# Patient Record
Sex: FEMALE | Race: WHITE | Employment: UNEMPLOYED
[De-identification: names, ages, dates, MRNs, and addresses within clinical notes are randomized per-mention and may not be internally consistent; named-entity substitution may affect disease eponyms.]

---

## 2017-07-29 ENCOUNTER — HEALTH MAINTENANCE LETTER (OUTPATIENT)
Age: 32
End: 2017-07-29

## 2019-01-13 ENCOUNTER — HOSPITAL ENCOUNTER (EMERGENCY)
Facility: CLINIC | Age: 34
Discharge: HOME | End: 2019-01-13
Attending: EMERGENCY MEDICINE | Admitting: EMERGENCY MEDICINE
Payer: COMMERCIAL

## 2019-01-13 VITALS
HEART RATE: 107 BPM | HEIGHT: 72 IN | DIASTOLIC BLOOD PRESSURE: 101 MMHG | RESPIRATION RATE: 16 BRPM | OXYGEN SATURATION: 97 % | TEMPERATURE: 97.8 F | SYSTOLIC BLOOD PRESSURE: 130 MMHG | BODY MASS INDEX: 17.61 KG/M2 | WEIGHT: 130 LBS

## 2019-01-13 DIAGNOSIS — S01.81XA CHIN LACERATION, INITIAL ENCOUNTER: ICD-10-CM

## 2019-01-13 PROCEDURE — 99283 EMERGENCY DEPT VISIT LOW MDM: CPT | Performed by: EMERGENCY MEDICINE

## 2019-01-13 PROCEDURE — 99282 EMERGENCY DEPT VISIT SF MDM: CPT | Mod: 25 | Performed by: EMERGENCY MEDICINE

## 2019-01-13 PROCEDURE — 12011 RPR F/E/E/N/L/M 2.5 CM/<: CPT | Performed by: EMERGENCY MEDICINE

## 2019-01-13 PROCEDURE — 12011 RPR F/E/E/N/L/M 2.5 CM/<: CPT | Mod: Z6 | Performed by: EMERGENCY MEDICINE

## 2019-01-13 RX ORDER — LEVONORGESTREL/ETHIN.ESTRADIOL 0.1-0.02MG
1 TABLET ORAL AT BEDTIME
COMMUNITY

## 2019-01-13 ASSESSMENT — MIFFLIN-ST. JEOR: SCORE: 1427.56

## 2019-01-13 NOTE — ED TRIAGE NOTES
Pt states when leaving work tonight she slipped on the ice and fell forward. Pt hit chin on ice with laceration present under chin. Pt denies neck pain, back pain or LOC. Pt states she has some discomfort of knees, but is only concerned about chin lac. Bleeding is currently controlled.

## 2019-01-13 NOTE — ED PROVIDER NOTES
History     Chief Complaint   Patient presents with     Fall     0145 slipped on ice, hit chin on ice. no LOC. no back or neck pain. laceration present under chin     Facial Laceration     under chin, bleeding controlled.      HPI  Devika Weiss is a 33 year old female who presents for laceration.  Localized to chin.  Occurred when she slipped on ice just prior to arrival when leaving work.  She does not have changes to distal motor or sensation.  She has taken nothing for his pain.  Tetanus immunization status is up to date.  No other injuries.  She denies loss of consciousness.  No nausea or vomiting.  Denies headache, neck pain, chest pain, wrist pain.  Mild pain to the chin, aching.    Problem List:    Patient Active Problem List    Diagnosis Date Noted     Antepartum hemorrhage 09/21/2015     Priority: Medium     CARDIOVASCULAR SCREENING; LDL GOAL LESS THAN 160 10/31/2010     Priority: Medium     Encounter for routine gynecological examination 06/08/2010     Priority: Medium     Problem list name updated by automated process. Provider to review       Smoker 04/09/2009     Priority: Medium     She was encouraged to quit smoking.       Recurrent pregnancy loss, currently pregnant 05/06/2008     Priority: Medium     Problem list name updated by automated process. Provider to review       BICORNUATE UTERUS 05/06/2008     Priority: Medium     Problem list name updated by automated process. Provider to review and confirm          Past Medical History:    Past Medical History:   Diagnosis Date     ADHD      Allergic state        Past Surgical History:    Past Surgical History:   Procedure Laterality Date     D & C       SURGICAL HISTORY OF -       oral surgery       Family History:    Family History   Problem Relation Age of Onset     Cancer Brother         osteo carcoma     Genetic Disorder Brother         cleft lip/palate     Diabetes Father      Hypertension Father      Heart Disease Paternal Grandfather       "Hypertension Paternal Grandfather      Heart Disease Maternal Grandfather      Hypertension Maternal Grandfather      Heart Disease Paternal Grandmother      Hypertension Paternal Grandmother      Hypertension Mother        Social History:  Marital Status:   [2]  Social History     Tobacco Use     Smoking status: Former Smoker     Types: Cigarettes     Smokeless tobacco: Never Used     Tobacco comment: 5 per day   Substance Use Topics     Alcohol use: Yes     Comment: occassionally     Drug use: No        Medications:      Amphetamine-Dextroamphetamine (ADDERALL PO)   levonorgestrel-ethinyl estradiol (AVIANE/ALESSE/LESSINA) 0.1-20 MG-MCG tablet   Prenatal Vit-Fe Fumarate-FA (PRENATAL MULTIVITAMIN  PLUS IRON) 27-0.8 MG TABS   HYDROcodone-acetaminophen 5-325 MG per tablet         Review of Systems  A 4 point review of systems was performed. All pertinent positives and negatives were listed in the HPI and rest of ROS were otherwise negative.    Physical Exam   BP: (!) 130/101  Pulse: 107  Temp: 97.8  F (36.6  C)  Resp: 16  Height: 185.4 cm (6' 1\")  Weight: 59 kg (130 lb)  SpO2: 97 %      Physical Exam   Constitutional: She is oriented to person, place, and time. She appears well-developed and well-nourished. No distress.   HENT:   Head: Normocephalic.   Right Ear: Tympanic membrane normal.   Left Ear: Tympanic membrane normal.   Nose: Nose normal.   Mouth/Throat: No trismus in the jaw. No lacerations.   Eyes: No scleral icterus.   Neck: Normal range of motion. Neck supple.   Musculoskeletal:   Pain and swelling over the bilateral hands from boxing yesterday per patient   Neurological: She is alert and oriented to person, place, and time.   Skin: Skin is warm and dry. No rash noted. She is not diaphoretic. No erythema. No pallor.   Laceration to the chin, bleeding controlled       ED Course        Laceration repair  Date/Time: 1/13/2019 3:02 AM  Performed by: Jayden Cota MD  Authorized by: Beata, " Jayden Lacey MD   Consent: Verbal consent obtained.  Consent given by: patient  Body area: head/neck  Location details: chin  Laceration length: 2 cm  Foreign bodies: no foreign bodies  Tendon involvement: none  Nerve involvement: none  Vascular damage: no  Anesthesia: local infiltration    Anesthesia:  Local Anesthetic: bupivacaine 0.5% without epinephrine    Sedation:  Patient sedated: no    Preparation: Patient was prepped and draped in the usual sterile fashion.  Debridement: none  Degree of undermining: none  Skin closure: 6-0 nylon  Subcutaneous closure: 5-0 Vicryl  Number of sutures: 4  Technique: simple  Approximation: close  Approximation difficulty: simple  Dressing: antibiotic ointment  Patient tolerance: Patient tolerated the procedure well with no immediate complications                     Critical Care time:  none               No results found for this or any previous visit (from the past 24 hour(s)).    Medications - No data to display    Assessments & Plan (with Medical Decision Making)   Patient remained stable.  Based on mechanism of injury, I am not concerned for retained foreign body. The laceration was anesthetized, irrigated and closed, see associated procedure note.  Will not prescribe abx.  She will be discharged home.  Will return to the ED 4-5 days for suture removal, sooner for signs of infection.    I have reviewed the nursing notes.    I have reviewed the findings, diagnosis, plan and need for follow up with the patient.          Medication List      There are no discharge medications for this visit.         Final diagnoses:   Chin laceration, initial encounter       1/13/2019   [facility] EMERGENCY DEPARTMENT     Jayden Cota MD  01/13/19 0448

## 2019-01-13 NOTE — ED AVS SNAPSHOT
[facility] Emergency Department  [address] [address]  Phone:  [phone]  Fax:  [phone]                                    Devika Weiss   MRN: [id number]    Department:  [facility] Emergency Department   Date of Visit:  1/13/2019           After Visit Summary Signature Page    I have received my discharge instructions, and my questions have been answered. I have discussed any challenges I see with this plan with the nurse or doctor.    ..........................................................................................................................................  Patient/Patient Representative Signature      ..........................................................................................................................................  Patient Representative Print Name and Relationship to Patient    ..................................................               ................................................  Date                                   Time    ..........................................................................................................................................  Reviewed by Signature/Title    ...................................................              ..............................................  Date                                               Time          22EPIC Rev 08/18

## 2019-01-13 NOTE — DISCHARGE INSTRUCTIONS
Please keep the wound clean and dry for 24-48 hours. The affected area should be kept elevated as much as possible. After 24 hours, the dressing may be removed and the wound may be gently cleaned with warm water. You may apply antibiotic ointment as desired. You should return in 4-5 days for suture removal. Please return to the ER or to primary care physician immediately if you develop warmth, redness, swelling, drainage from the wound or have fevers.   Any time the skin is damaged, scar formation is unavoidable. You can minimize the scar by following the instructions listed above, and by preventing infection. The scar will continue to change for at least 1 year, and the final appearance of the scar will not be known until at least that time. One way to minimize scar formation is to apply sun screen to the scar before any sun exposure for 12 months following wound repair, as sunburn or even tanning may cause the scar to become discolored.

## 2021-11-08 ENCOUNTER — APPOINTMENT (OUTPATIENT)
Dept: RADIOLOGY | Facility: HOSPITAL | Age: 36
DRG: 177 | End: 2021-11-08
Attending: STUDENT IN AN ORGANIZED HEALTH CARE EDUCATION/TRAINING PROGRAM
Payer: COMMERCIAL

## 2021-11-08 ENCOUNTER — HOSPITAL ENCOUNTER (INPATIENT)
Facility: HOSPITAL | Age: 36
LOS: 6 days | Discharge: HOME | DRG: 177 | End: 2021-11-15
Attending: EMERGENCY MEDICINE | Admitting: HOSPITALIST
Payer: COMMERCIAL

## 2021-11-08 ENCOUNTER — APPOINTMENT (OUTPATIENT)
Dept: CT IMAGING | Facility: HOSPITAL | Age: 36
DRG: 177 | End: 2021-11-08
Attending: EMERGENCY MEDICINE
Payer: COMMERCIAL

## 2021-11-08 DIAGNOSIS — E87.6 HYPOKALEMIA: ICD-10-CM

## 2021-11-08 DIAGNOSIS — U07.1 PNEUMONIA DUE TO 2019 NOVEL CORONAVIRUS: ICD-10-CM

## 2021-11-08 DIAGNOSIS — R09.02 HYPOXEMIA: ICD-10-CM

## 2021-11-08 DIAGNOSIS — E83.42 HYPOMAGNESEMIA: ICD-10-CM

## 2021-11-08 DIAGNOSIS — J12.82 PNEUMONIA DUE TO 2019 NOVEL CORONAVIRUS: ICD-10-CM

## 2021-11-08 LAB
ANION GAP SERPL CALCULATED.3IONS-SCNC: 10 MMOL/L (ref 5–18)
BASOPHILS # BLD MANUAL: 0 10E3/UL (ref 0–0.2)
BASOPHILS NFR BLD MANUAL: 0 %
BUN SERPL-MCNC: 4 MG/DL (ref 8–22)
CALCIUM SERPL-MCNC: 9 MG/DL (ref 8.5–10.5)
CHLORIDE BLD-SCNC: 102 MMOL/L (ref 98–107)
CO2 SERPL-SCNC: 27 MMOL/L (ref 22–31)
CREAT SERPL-MCNC: 0.54 MG/DL (ref 0.6–1.1)
D DIMER PPP FEU-MCNC: 0.78 UG/ML FEU (ref 0–0.5)
EOSINOPHIL # BLD MANUAL: 0 10E3/UL (ref 0–0.7)
EOSINOPHIL NFR BLD MANUAL: 0 %
ERYTHROCYTE [DISTWIDTH] IN BLOOD BY AUTOMATED COUNT: 13 % (ref 10–15)
FLUAV RNA SPEC QL NAA+PROBE: NEGATIVE
FLUBV RNA RESP QL NAA+PROBE: NEGATIVE
GFR SERPL CREATININE-BSD FRML MDRD: >90 ML/MIN/1.73M2
GLUCOSE BLD-MCNC: 101 MG/DL (ref 70–125)
HCT VFR BLD AUTO: 39.9 % (ref 35–47)
HGB BLD-MCNC: 13.6 G/DL (ref 11.7–15.7)
LYMPHOCYTES # BLD MANUAL: 0.2 10E3/UL (ref 0.8–5.3)
LYMPHOCYTES NFR BLD MANUAL: 7 %
MAGNESIUM SERPL-MCNC: 1.2 MG/DL (ref 1.8–2.6)
MCH RBC QN AUTO: 37.3 PG (ref 26.5–33)
MCHC RBC AUTO-ENTMCNC: 34.1 G/DL (ref 31.5–36.5)
MCV RBC AUTO: 109 FL (ref 78–100)
MONOCYTES # BLD MANUAL: 0.2 10E3/UL (ref 0–1.3)
MONOCYTES NFR BLD MANUAL: 6 %
NEUTROPHILS # BLD MANUAL: 2.4 10E3/UL (ref 1.6–8.3)
NEUTROPHILS NFR BLD MANUAL: 87 %
PLAT MORPH BLD: ABNORMAL
PLATELET # BLD AUTO: 79 10E3/UL (ref 150–450)
POTASSIUM BLD-SCNC: 2.7 MMOL/L (ref 3.5–5)
RBC # BLD AUTO: 3.65 10E6/UL (ref 3.8–5.2)
RBC MORPH BLD: ABNORMAL
SARS-COV-2 RNA RESP QL NAA+PROBE: POSITIVE
SODIUM SERPL-SCNC: 139 MMOL/L (ref 136–145)
TROPONIN I SERPL-MCNC: 0.01 NG/ML (ref 0–0.29)
WBC # BLD AUTO: 2.8 10E3/UL (ref 4–11)

## 2021-11-08 PROCEDURE — XW033E5 INTRODUCTION OF REMDESIVIR ANTI-INFECTIVE INTO PERIPHERAL VEIN, PERCUTANEOUS APPROACH, NEW TECHNOLOGY GROUP 5: ICD-10-PCS | Performed by: EMERGENCY MEDICINE

## 2021-11-08 PROCEDURE — 85379 FIBRIN DEGRADATION QUANT: CPT | Performed by: EMERGENCY MEDICINE

## 2021-11-08 PROCEDURE — 85384 FIBRINOGEN ACTIVITY: CPT | Performed by: HOSPITALIST

## 2021-11-08 PROCEDURE — 84145 PROCALCITONIN (PCT): CPT | Performed by: EMERGENCY MEDICINE

## 2021-11-08 PROCEDURE — 93005 ELECTROCARDIOGRAM TRACING: CPT | Performed by: EMERGENCY MEDICINE

## 2021-11-08 PROCEDURE — 86141 C-REACTIVE PROTEIN HS: CPT | Performed by: HOSPITALIST

## 2021-11-08 PROCEDURE — 250N000011 HC RX IP 250 OP 636: Performed by: EMERGENCY MEDICINE

## 2021-11-08 PROCEDURE — 36415 COLL VENOUS BLD VENIPUNCTURE: CPT | Performed by: HOSPITALIST

## 2021-11-08 PROCEDURE — 250N000011 HC RX IP 250 OP 636: Performed by: FAMILY MEDICINE

## 2021-11-08 PROCEDURE — 87636 SARSCOV2 & INF A&B AMP PRB: CPT | Performed by: FAMILY MEDICINE

## 2021-11-08 PROCEDURE — 80048 BASIC METABOLIC PNL TOTAL CA: CPT | Performed by: FAMILY MEDICINE

## 2021-11-08 PROCEDURE — 93005 ELECTROCARDIOGRAM TRACING: CPT | Performed by: STUDENT IN AN ORGANIZED HEALTH CARE EDUCATION/TRAINING PROGRAM

## 2021-11-08 PROCEDURE — 99285 EMERGENCY DEPT VISIT HI MDM: CPT | Mod: 25

## 2021-11-08 PROCEDURE — 82728 ASSAY OF FERRITIN: CPT | Performed by: HOSPITALIST

## 2021-11-08 PROCEDURE — 71046 X-RAY EXAM CHEST 2 VIEWS: CPT

## 2021-11-08 PROCEDURE — 85610 PROTHROMBIN TIME: CPT | Performed by: HOSPITALIST

## 2021-11-08 PROCEDURE — C9803 HOPD COVID-19 SPEC COLLECT: HCPCS

## 2021-11-08 PROCEDURE — 36415 COLL VENOUS BLD VENIPUNCTURE: CPT | Performed by: EMERGENCY MEDICINE

## 2021-11-08 PROCEDURE — 83735 ASSAY OF MAGNESIUM: CPT | Performed by: FAMILY MEDICINE

## 2021-11-08 PROCEDURE — 84484 ASSAY OF TROPONIN QUANT: CPT | Performed by: EMERGENCY MEDICINE

## 2021-11-08 PROCEDURE — 71275 CT ANGIOGRAPHY CHEST: CPT

## 2021-11-08 PROCEDURE — 82248 BILIRUBIN DIRECT: CPT | Performed by: EMERGENCY MEDICINE

## 2021-11-08 PROCEDURE — 85027 COMPLETE CBC AUTOMATED: CPT | Performed by: FAMILY MEDICINE

## 2021-11-08 PROCEDURE — 36415 COLL VENOUS BLD VENIPUNCTURE: CPT | Performed by: FAMILY MEDICINE

## 2021-11-08 RX ORDER — MAGNESIUM SULFATE HEPTAHYDRATE 40 MG/ML
2 INJECTION, SOLUTION INTRAVENOUS ONCE
Status: COMPLETED | OUTPATIENT
Start: 2021-11-09 | End: 2021-11-09

## 2021-11-08 RX ORDER — ONDANSETRON 4 MG/1
4 TABLET, ORALLY DISINTEGRATING ORAL ONCE
Status: COMPLETED | OUTPATIENT
Start: 2021-11-08 | End: 2021-11-08

## 2021-11-08 RX ORDER — IOPAMIDOL 755 MG/ML
100 INJECTION, SOLUTION INTRAVASCULAR ONCE
Status: COMPLETED | OUTPATIENT
Start: 2021-11-09 | End: 2021-11-08

## 2021-11-08 RX ORDER — POTASSIUM CHLORIDE 7.45 MG/ML
10 INJECTION INTRAVENOUS ONCE
Status: COMPLETED | OUTPATIENT
Start: 2021-11-09 | End: 2021-11-09

## 2021-11-08 RX ORDER — POTASSIUM CHLORIDE 20MEQ/15ML
40 LIQUID (ML) ORAL ONCE
Status: COMPLETED | OUTPATIENT
Start: 2021-11-08 | End: 2021-11-09

## 2021-11-08 RX ORDER — BENZONATATE 100 MG/1
100 CAPSULE ORAL 3 TIMES DAILY PRN
Status: DISCONTINUED | OUTPATIENT
Start: 2021-11-08 | End: 2021-11-09

## 2021-11-08 RX ORDER — DEXAMETHASONE SODIUM PHOSPHATE 10 MG/ML
6 INJECTION, SOLUTION INTRAMUSCULAR; INTRAVENOUS ONCE
Status: COMPLETED | OUTPATIENT
Start: 2021-11-09 | End: 2021-11-09

## 2021-11-08 RX ORDER — ALBUTEROL SULFATE 90 UG/1
6 AEROSOL, METERED RESPIRATORY (INHALATION)
Status: DISCONTINUED | OUTPATIENT
Start: 2021-11-09 | End: 2021-11-12

## 2021-11-08 RX ADMIN — ONDANSETRON 4 MG: 4 TABLET, ORALLY DISINTEGRATING ORAL at 23:03

## 2021-11-08 RX ADMIN — IOPAMIDOL 100 ML: 755 INJECTION, SOLUTION INTRAVENOUS at 23:39

## 2021-11-08 ASSESSMENT — MIFFLIN-ST. JEOR: SCORE: 1448.84

## 2021-11-09 PROBLEM — R79.89 LFT ELEVATION: Status: ACTIVE | Noted: 2021-11-09

## 2021-11-09 PROBLEM — E83.42 HYPOMAGNESEMIA: Status: ACTIVE | Noted: 2021-11-09

## 2021-11-09 PROBLEM — U07.1 PNEUMONIA DUE TO 2019 NOVEL CORONAVIRUS: Status: ACTIVE | Noted: 2021-11-09

## 2021-11-09 PROBLEM — E87.6 HYPOKALEMIA: Status: ACTIVE | Noted: 2021-11-09

## 2021-11-09 PROBLEM — J12.82 PNEUMONIA DUE TO 2019 NOVEL CORONAVIRUS: Status: ACTIVE | Noted: 2021-11-09

## 2021-11-09 PROBLEM — J96.01 ACUTE RESPIRATORY FAILURE WITH HYPOXIA (H): Status: ACTIVE | Noted: 2021-11-09

## 2021-11-09 PROBLEM — R09.02 HYPOXEMIA: Status: ACTIVE | Noted: 2021-11-09

## 2021-11-09 LAB
ABO/RH(D): NORMAL
ALBUMIN SERPL-MCNC: 2.9 G/DL (ref 3.5–5)
ALBUMIN SERPL-MCNC: 3.1 G/DL (ref 3.5–5)
ALP SERPL-CCNC: 186 U/L (ref 45–120)
ALP SERPL-CCNC: 196 U/L (ref 45–120)
ALT SERPL W P-5'-P-CCNC: 76 U/L (ref 0–45)
ALT SERPL W P-5'-P-CCNC: 92 U/L (ref 0–45)
ANION GAP SERPL CALCULATED.3IONS-SCNC: 12 MMOL/L (ref 5–18)
APTT PPP: 42 SECONDS (ref 22–38)
AST SERPL W P-5'-P-CCNC: 227 U/L (ref 0–40)
AST SERPL W P-5'-P-CCNC: 302 U/L (ref 0–40)
ATRIAL RATE - MUSE: 99 BPM
BILIRUB DIRECT SERPL-MCNC: 1.1 MG/DL
BILIRUB SERPL-MCNC: 1.6 MG/DL (ref 0–1)
BILIRUB SERPL-MCNC: 1.8 MG/DL (ref 0–1)
BUN SERPL-MCNC: 4 MG/DL (ref 8–22)
C REACTIVE PROTEIN LHE: 6.4 MG/DL (ref 0–0.8)
C REACTIVE PROTEIN LHE: 7.1 MG/DL (ref 0–0.8)
CALCIUM SERPL-MCNC: 8.3 MG/DL (ref 8.5–10.5)
CHLORIDE BLD-SCNC: 107 MMOL/L (ref 98–107)
CO2 SERPL-SCNC: 22 MMOL/L (ref 22–31)
CREAT SERPL-MCNC: 0.5 MG/DL (ref 0.6–1.1)
D DIMER PPP FEU-MCNC: 0.52 UG/ML FEU (ref 0–0.5)
DIASTOLIC BLOOD PRESSURE - MUSE: NORMAL MMHG
ERYTHROCYTE [DISTWIDTH] IN BLOOD BY AUTOMATED COUNT: 13 % (ref 10–15)
FERRITIN SERPL-MCNC: 3372 NG/ML (ref 10–130)
FIBRINOGEN PPP-MCNC: 435 MG/DL (ref 170–490)
FIBRINOGEN PPP-MCNC: 441 MG/DL (ref 170–490)
GFR SERPL CREATININE-BSD FRML MDRD: >90 ML/MIN/1.73M2
GLUCOSE BLD-MCNC: 136 MG/DL (ref 70–125)
HCT VFR BLD AUTO: 38.4 % (ref 35–47)
HGB BLD-MCNC: 12.9 G/DL (ref 11.7–15.7)
INR PPP: 1.07 (ref 0.9–1.15)
INTERPRETATION ECG - MUSE: NORMAL
LDH SERPL L TO P-CCNC: 457 U/L (ref 125–220)
MAGNESIUM SERPL-MCNC: 1.8 MG/DL (ref 1.8–2.6)
MCH RBC QN AUTO: 37.2 PG (ref 26.5–33)
MCHC RBC AUTO-ENTMCNC: 33.6 G/DL (ref 31.5–36.5)
MCV RBC AUTO: 111 FL (ref 78–100)
P AXIS - MUSE: 60 DEGREES
PLATELET # BLD AUTO: 79 10E3/UL (ref 150–450)
POTASSIUM BLD-SCNC: 3.4 MMOL/L (ref 3.5–5)
PR INTERVAL - MUSE: 144 MS
PROCALCITONIN SERPL-MCNC: 0.33 NG/ML (ref 0–0.49)
PROT SERPL-MCNC: 6.3 G/DL (ref 6–8)
PROT SERPL-MCNC: 6.9 G/DL (ref 6–8)
QRS DURATION - MUSE: 80 MS
QT - MUSE: 352 MS
QTC - MUSE: 451 MS
R AXIS - MUSE: 78 DEGREES
RBC # BLD AUTO: 3.47 10E6/UL (ref 3.8–5.2)
SODIUM SERPL-SCNC: 141 MMOL/L (ref 136–145)
SPECIMEN EXPIRATION DATE: NORMAL
SYSTOLIC BLOOD PRESSURE - MUSE: NORMAL MMHG
T AXIS - MUSE: 47 DEGREES
VENTRICULAR RATE- MUSE: 99 BPM
WBC # BLD AUTO: 1.8 10E3/UL (ref 4–11)

## 2021-11-09 PROCEDURE — 80053 COMPREHEN METABOLIC PANEL: CPT | Performed by: EMERGENCY MEDICINE

## 2021-11-09 PROCEDURE — 99291 CRITICAL CARE FIRST HOUR: CPT | Performed by: INTERNAL MEDICINE

## 2021-11-09 PROCEDURE — 36415 COLL VENOUS BLD VENIPUNCTURE: CPT | Performed by: HOSPITALIST

## 2021-11-09 PROCEDURE — 250N000013 HC RX MED GY IP 250 OP 250 PS 637: Performed by: HOSPITALIST

## 2021-11-09 PROCEDURE — 250N000009 HC RX 250: Performed by: EMERGENCY MEDICINE

## 2021-11-09 PROCEDURE — 96367 TX/PROPH/DG ADDL SEQ IV INF: CPT

## 2021-11-09 PROCEDURE — 250N000011 HC RX IP 250 OP 636: Performed by: EMERGENCY MEDICINE

## 2021-11-09 PROCEDURE — 258N000003 HC RX IP 258 OP 636: Performed by: EMERGENCY MEDICINE

## 2021-11-09 PROCEDURE — 83520 IMMUNOASSAY QUANT NOS NONAB: CPT | Performed by: HOSPITALIST

## 2021-11-09 PROCEDURE — 96365 THER/PROPH/DIAG IV INF INIT: CPT | Mod: 59

## 2021-11-09 PROCEDURE — 99223 1ST HOSP IP/OBS HIGH 75: CPT | Mod: AI | Performed by: HOSPITALIST

## 2021-11-09 PROCEDURE — 200N000001 HC R&B ICU

## 2021-11-09 PROCEDURE — 85730 THROMBOPLASTIN TIME PARTIAL: CPT | Performed by: HOSPITALIST

## 2021-11-09 PROCEDURE — 250N000013 HC RX MED GY IP 250 OP 250 PS 637: Performed by: FAMILY MEDICINE

## 2021-11-09 PROCEDURE — 250N000011 HC RX IP 250 OP 636: Performed by: HOSPITALIST

## 2021-11-09 PROCEDURE — 999N000157 HC STATISTIC RCP TIME EA 10 MIN

## 2021-11-09 PROCEDURE — 250N000012 HC RX MED GY IP 250 OP 636 PS 637: Performed by: HOSPITALIST

## 2021-11-09 PROCEDURE — 96375 TX/PRO/DX INJ NEW DRUG ADDON: CPT

## 2021-11-09 PROCEDURE — 86900 BLOOD TYPING SEROLOGIC ABO: CPT | Performed by: HOSPITALIST

## 2021-11-09 PROCEDURE — 85384 FIBRINOGEN ACTIVITY: CPT | Performed by: HOSPITALIST

## 2021-11-09 PROCEDURE — 86141 C-REACTIVE PROTEIN HS: CPT | Performed by: HOSPITALIST

## 2021-11-09 PROCEDURE — 272N000054 HC CANNULA HIGH FLOW, ADULT

## 2021-11-09 PROCEDURE — 250N000013 HC RX MED GY IP 250 OP 250 PS 637: Performed by: EMERGENCY MEDICINE

## 2021-11-09 PROCEDURE — 85027 COMPLETE CBC AUTOMATED: CPT | Performed by: HOSPITALIST

## 2021-11-09 PROCEDURE — 99207 PR CDG-CORRECTLY CODED, REVIEWED AND AGREE: CPT | Performed by: HOSPITALIST

## 2021-11-09 PROCEDURE — 83615 LACTATE (LD) (LDH) ENZYME: CPT | Performed by: HOSPITALIST

## 2021-11-09 PROCEDURE — 999N000215 HC STATISTIC HFNC ADULT NON-CPAP

## 2021-11-09 PROCEDURE — 83735 ASSAY OF MAGNESIUM: CPT | Performed by: INTERNAL MEDICINE

## 2021-11-09 PROCEDURE — 94640 AIRWAY INHALATION TREATMENT: CPT

## 2021-11-09 PROCEDURE — 99232 SBSQ HOSP IP/OBS MODERATE 35: CPT | Performed by: INTERNAL MEDICINE

## 2021-11-09 PROCEDURE — 250N000013 HC RX MED GY IP 250 OP 250 PS 637: Performed by: INTERNAL MEDICINE

## 2021-11-09 PROCEDURE — 85379 FIBRIN DEGRADATION QUANT: CPT | Performed by: HOSPITALIST

## 2021-11-09 PROCEDURE — 85300 ANTITHROMBIN III ACTIVITY: CPT | Performed by: HOSPITALIST

## 2021-11-09 RX ORDER — ONDANSETRON 2 MG/ML
4 INJECTION INTRAMUSCULAR; INTRAVENOUS EVERY 6 HOURS PRN
Status: DISCONTINUED | OUTPATIENT
Start: 2021-11-09 | End: 2021-11-15 | Stop reason: HOSPADM

## 2021-11-09 RX ORDER — ONDANSETRON 4 MG/1
4 TABLET, ORALLY DISINTEGRATING ORAL EVERY 6 HOURS PRN
Status: DISCONTINUED | OUTPATIENT
Start: 2021-11-09 | End: 2021-11-15 | Stop reason: HOSPADM

## 2021-11-09 RX ORDER — LIDOCAINE 40 MG/G
CREAM TOPICAL
Status: DISCONTINUED | OUTPATIENT
Start: 2021-11-09 | End: 2021-11-15 | Stop reason: HOSPADM

## 2021-11-09 RX ORDER — FAMOTIDINE 20 MG/1
20 TABLET, FILM COATED ORAL 2 TIMES DAILY
Status: DISCONTINUED | OUTPATIENT
Start: 2021-11-09 | End: 2021-11-15 | Stop reason: HOSPADM

## 2021-11-09 RX ORDER — BISACODYL 10 MG
10 SUPPOSITORY, RECTAL RECTAL DAILY PRN
Status: DISCONTINUED | OUTPATIENT
Start: 2021-11-09 | End: 2021-11-15 | Stop reason: HOSPADM

## 2021-11-09 RX ORDER — ACETAMINOPHEN 325 MG/1
650 TABLET ORAL EVERY 6 HOURS PRN
Status: DISCONTINUED | OUTPATIENT
Start: 2021-11-09 | End: 2021-11-15 | Stop reason: HOSPADM

## 2021-11-09 RX ORDER — LANOLIN ALCOHOL/MO/W.PET/CERES
3 CREAM (GRAM) TOPICAL
Status: DISCONTINUED | OUTPATIENT
Start: 2021-11-09 | End: 2021-11-15 | Stop reason: HOSPADM

## 2021-11-09 RX ORDER — HYDROXYZINE HYDROCHLORIDE 25 MG/1
25 TABLET, FILM COATED ORAL EVERY 6 HOURS PRN
Status: DISCONTINUED | OUTPATIENT
Start: 2021-11-09 | End: 2021-11-15 | Stop reason: HOSPADM

## 2021-11-09 RX ORDER — AMOXICILLIN 250 MG
1 CAPSULE ORAL 2 TIMES DAILY PRN
Status: DISCONTINUED | OUTPATIENT
Start: 2021-11-09 | End: 2021-11-15 | Stop reason: HOSPADM

## 2021-11-09 RX ORDER — ACETAMINOPHEN 650 MG/1
650 SUPPOSITORY RECTAL EVERY 6 HOURS PRN
Status: DISCONTINUED | OUTPATIENT
Start: 2021-11-09 | End: 2021-11-15 | Stop reason: HOSPADM

## 2021-11-09 RX ORDER — BENZONATATE 100 MG/1
100 CAPSULE ORAL
Status: DISCONTINUED | OUTPATIENT
Start: 2021-11-09 | End: 2021-11-15 | Stop reason: HOSPADM

## 2021-11-09 RX ORDER — DEXAMETHASONE 2 MG/1
6 TABLET ORAL DAILY
Status: DISCONTINUED | OUTPATIENT
Start: 2021-11-09 | End: 2021-11-15

## 2021-11-09 RX ORDER — ALBUTEROL SULFATE 90 UG/1
2 AEROSOL, METERED RESPIRATORY (INHALATION) 4 TIMES DAILY
Status: DISCONTINUED | OUTPATIENT
Start: 2021-11-09 | End: 2021-11-09

## 2021-11-09 RX ORDER — ALBUTEROL SULFATE 90 UG/1
2 AEROSOL, METERED RESPIRATORY (INHALATION) EVERY 4 HOURS PRN
Status: DISCONTINUED | OUTPATIENT
Start: 2021-11-09 | End: 2021-11-15 | Stop reason: HOSPADM

## 2021-11-09 RX ORDER — NICOTINE 21 MG/24HR
1 PATCH, TRANSDERMAL 24 HOURS TRANSDERMAL DAILY
Status: DISCONTINUED | OUTPATIENT
Start: 2021-11-09 | End: 2021-11-15 | Stop reason: HOSPADM

## 2021-11-09 RX ORDER — PROCHLORPERAZINE MALEATE 10 MG
10 TABLET ORAL EVERY 6 HOURS PRN
Status: DISCONTINUED | OUTPATIENT
Start: 2021-11-09 | End: 2021-11-15 | Stop reason: HOSPADM

## 2021-11-09 RX ORDER — PROCHLORPERAZINE 25 MG
25 SUPPOSITORY, RECTAL RECTAL EVERY 12 HOURS PRN
Status: DISCONTINUED | OUTPATIENT
Start: 2021-11-09 | End: 2021-11-15 | Stop reason: HOSPADM

## 2021-11-09 RX ADMIN — ENOXAPARIN SODIUM 40 MG: 40 INJECTION SUBCUTANEOUS at 03:25

## 2021-11-09 RX ADMIN — BARICITINIB 4 MG: 2 TABLET, FILM COATED ORAL at 10:45

## 2021-11-09 RX ADMIN — MAGNESIUM SULFATE HEPTAHYDRATE 2 G: 40 INJECTION, SOLUTION INTRAVENOUS at 00:06

## 2021-11-09 RX ADMIN — ALBUTEROL SULFATE 6 PUFF: 90 AEROSOL, METERED RESPIRATORY (INHALATION) at 02:03

## 2021-11-09 RX ADMIN — REMDESIVIR 200 MG: 100 INJECTION, POWDER, LYOPHILIZED, FOR SOLUTION INTRAVENOUS at 02:10

## 2021-11-09 RX ADMIN — NICOTINE 1 PATCH: 14 PATCH, EXTENDED RELEASE TRANSDERMAL at 08:20

## 2021-11-09 RX ADMIN — HYDROXYZINE HYDROCHLORIDE 25 MG: 25 TABLET, FILM COATED ORAL at 03:22

## 2021-11-09 RX ADMIN — FAMOTIDINE 20 MG: 20 TABLET, FILM COATED ORAL at 20:33

## 2021-11-09 RX ADMIN — BENZONATATE 100 MG: 100 CAPSULE ORAL at 16:00

## 2021-11-09 RX ADMIN — DEXAMETHASONE SODIUM PHOSPHATE 6 MG: 10 INJECTION INTRAMUSCULAR; INTRAVENOUS at 00:11

## 2021-11-09 RX ADMIN — POTASSIUM CHLORIDE 10 MEQ: 7.46 INJECTION, SOLUTION INTRAVENOUS at 00:12

## 2021-11-09 RX ADMIN — DEXAMETHASONE 6 MG: 2 TABLET ORAL at 12:33

## 2021-11-09 RX ADMIN — ALBUTEROL SULFATE 6 PUFF: 90 AEROSOL, METERED RESPIRATORY (INHALATION) at 20:30

## 2021-11-09 RX ADMIN — POTASSIUM CHLORIDE 40 MEQ: 1.5 SOLUTION ORAL at 00:09

## 2021-11-09 RX ADMIN — ALBUTEROL SULFATE 6 PUFF: 90 AEROSOL, METERED RESPIRATORY (INHALATION) at 08:20

## 2021-11-09 RX ADMIN — FAMOTIDINE 20 MG: 20 TABLET, FILM COATED ORAL at 08:20

## 2021-11-09 RX ADMIN — ALBUTEROL SULFATE 6 PUFF: 90 AEROSOL, METERED RESPIRATORY (INHALATION) at 14:53

## 2021-11-09 RX ADMIN — SODIUM CHLORIDE 50 ML: 9 INJECTION, SOLUTION INTRAVENOUS at 02:11

## 2021-11-09 RX ADMIN — BENZONATATE 100 MG: 100 CAPSULE ORAL at 09:11

## 2021-11-09 ASSESSMENT — ENCOUNTER SYMPTOMS
CHEST TIGHTNESS: 1
COUGH: 1
APPETITE CHANGE: 1
MYALGIAS: 1
FEVER: 1
SHORTNESS OF BREATH: 1

## 2021-11-09 ASSESSMENT — ACTIVITIES OF DAILY LIVING (ADL)
ADLS_ACUITY_SCORE: 12
ADLS_ACUITY_SCORE: 9
ADLS_ACUITY_SCORE: 9
ADLS_ACUITY_SCORE: 14
ADLS_ACUITY_SCORE: 12
ADLS_ACUITY_SCORE: 9
DRESSING/BATHING_DIFFICULTY: NO
ADLS_ACUITY_SCORE: 12
HEARING_DIFFICULTY_OR_DEAF: NO
ADLS_ACUITY_SCORE: 12
ADLS_ACUITY_SCORE: 14
ADLS_ACUITY_SCORE: 5
ADLS_ACUITY_SCORE: 5
ADLS_ACUITY_SCORE: 14
TOILETING_ISSUES: NO
ADLS_ACUITY_SCORE: 9
DIFFICULTY_COMMUNICATING: NO
DOING_ERRANDS_INDEPENDENTLY_DIFFICULTY: NO
WEAR_GLASSES_OR_BLIND: NO
ADLS_ACUITY_SCORE: 12
ADLS_ACUITY_SCORE: 9
ADLS_ACUITY_SCORE: 12
FALL_HISTORY_WITHIN_LAST_SIX_MONTHS: NO
ADLS_ACUITY_SCORE: 9
DEPENDENT_IADLS:: INDEPENDENT
ADLS_ACUITY_SCORE: 9
PATIENT_/_FAMILY_COMMUNICATION_STYLE: SPOKEN LANGUAGE (ENGLISH OR BILINGUAL)
ADLS_ACUITY_SCORE: 9
ADLS_ACUITY_SCORE: 12
ADLS_ACUITY_SCORE: 12
DIFFICULTY_EATING/SWALLOWING: NO
ADLS_ACUITY_SCORE: 5
CONCENTRATING,_REMEMBERING_OR_MAKING_DECISIONS_DIFFICULTY: NO
WALKING_OR_CLIMBING_STAIRS_DIFFICULTY: NO

## 2021-11-09 ASSESSMENT — MIFFLIN-ST. JEOR: SCORE: 1359.94

## 2021-11-09 NOTE — ED PROVIDER NOTES
NAME: Devika Velázquez  AGE: 36 year old female  YOB: 1985  MRN: [id number]  EVALUATION DATE & TIME: 2021 11:20 PM    PCP: Violetta Mendiola    ED PROVIDER: Pawel Brown M.D.      Chief Complaint   Patient presents with     Shortness of Breath     FINAL IMPRESSION:  1. Pneumonia due to 2019 novel coronavirus    2. Hypoxemia    3. Hypokalemia    4. Hypomagnesemia      MEDICAL DECISION MAKIN:00 AM I met with the patient, obtained history, performed an initial exam, and discussed options and plan for diagnostics and treatment here in the ED.   1:08 AM I spoke with Dr. Verduzco with the [facility].  Pertinent Labs & Imaging studies reviewed. (See chart for details)     Patient was clinically assessed and consented to treatment. After assessment, medical decision making and workup were discussed with the patient. The patient was agreeable to plan for testing, workup, and treatment.    Devika Velázquez is a 36 year old female who presents with shortness of breath  Differential diagnosis includes but not limited to pneumonia, COVID-19, hypoxemia, pulmonary embolism, acute coronary syndrome, dehydration, acute kidney injury.  Patient otherwise healthy 36-year-old female presenting for shortness of breath and in triage was hypoxemic into the 80s.  Patient placed on oxygen there and oxygen in the low 90s and patient more comfortable.  Labs done from triage did show hypomagnesemia, hypokalemia, elevated D-dimer and no significant dehydration or kidney injury.  COVID-19 swab was positive and chest x-ray did show bilateral patchy infiltrates consistent with COVID-19.  Patient hypoxemic likely from COVID-19 however will also get CT scan to rule out pulmonary embolism associated with the COVID-19.  CTA was negative for PE and patient maintaining oxygen on nasal cannula.  She is comfortable however does have continued cough.  Tessalon Perles will be ordered for this along with albuterol to help with  some tightness that she feels with the coughing.  Additionally given the hypoxemia patient will need admission.  Trial of ambulation of oxygen resulted in oxygen saturations of 82-83% and confirms need for admission.  Patient discussed with Dr. Verduzco from the hospitalist service for admission.  Patient tolerating nasal cannula at 3-4 L presently.  After discussing admission patient had gotten up to go to bathroom again and desaturated to the low 80s.  Patient placed back in bed on nasal cannula and requiring 6 L to bring her saturations back up to 93%.  Discussion with patient will try high flow nasal cannula however patient felt anxious and uncomfortable with the high flow oxygen and could not tolerate this.  Patient switch back to mask and maintaining 93% with Ventimask.  We will continue this and patient's oxygenation was discussed with hospitalist who is aware and will continue monitoring with boarding in the ER.    The importance of close follow up was discussed. We reviewed warning signs and symptoms, and I instructed Ms. Velázquez to return to the emergency department immediately if she develops any new or worsening symptoms. I provided additional verbal discharge instructions. Ms. Velázquez expressed understanding and agreement with this plan of care, her questions were answered, and she was discharged in stable condition.       30 minutes of critical care time    MEDICATIONS GIVEN IN THE EMERGENCY:  Medications   benzonatate (TESSALON) capsule 100 mg (has no administration in time range)   albuterol (PROAIR HFA/PROVENTIL HFA/VENTOLIN HFA) 108 (90 Base) MCG/ACT inhaler 6 puff (6 puffs Inhalation Given 11/9/21 0203)   remdesivir 200 mg in sodium chloride 0.9 % 250 mL intermittent infusion (200 mg Intravenous New Bag 11/9/21 0210)     Followed by   0.9% sodium chloride BOLUS (50 mLs Intravenous New Bag 11/9/21 0211)   remdesivir 100 mg in sodium chloride 0.9 % 250 mL intermittent infusion (has no  administration in time range)     And   0.9% sodium chloride BOLUS (has no administration in time range)   potassium chloride (KAYCIEL) solution 40 mEq (40 mEq Oral Given 11/9/21 0009)   ondansetron (ZOFRAN-ODT) ODT tab 4 mg (4 mg Oral Given 11/8/21 2303)   iopamidol (ISOVUE-370) solution 100 mL (100 mLs Intravenous Given 11/8/21 2339)   magnesium sulfate 2 g in water intermittent infusion (0 g Intravenous Stopped 11/9/21 0117)   potassium chloride 10 mEq in 100 mL sterile water intermittent infusion (premix) (0 mEq Intravenous Stopped 11/9/21 0117)   dexamethasone PF (DECADRON) injection 6 mg (6 mg Intravenous Given 11/9/21 0011)       NEW PRESCRIPTIONS STARTED AT TODAY'S ER VISIT:  New Prescriptions    No medications on file          =================================================================    HPI    Patient information was obtained from: Patient    Use of : N/A       Devika Velázquez is a 36 year old female with no pertinent past medical history, who presents, via EMS, with shortness of breath.    The patient repots one week of myalgias, cough, fevers, and a decreased appetite.  She reports becoming short of breath today and she was hypoxic on arrival to the ED.  She reports some associated chest tightness due to the shortness of breath.  She denies any loss of taste or smell.  She is not vaccinated against Covid-19.    REVIEW OF SYSTEMS   Review of Systems   Constitutional: Positive for appetite change (Decreased) and fever.   HENT:        Negative for loss of taste or smell   Respiratory: Positive for cough, chest tightness and shortness of breath.    Musculoskeletal: Positive for myalgias.   All other systems reviewed and are negative.     PAST MEDICAL HISTORY:  Past Medical History:   Diagnosis Date     ADHD      Allergic state        PAST SURGICAL HISTORY:  Past Surgical History:   Procedure Laterality Date     C/SECTION, LOW TRANSVERSE  2009    Breech     D & C       LAPAROTOMY  EXPLORATORY  2012    endometriosis     SURGICAL HISTORY OF -       oral surgery       CURRENT MEDICATIONS:      Current Facility-Administered Medications:      remdesivir 100 mg in sodium chloride 0.9 % 250 mL intermittent infusion, 100 mg, Intravenous, Q24H **AND** 0.9% sodium chloride BOLUS, 50 mL, Intravenous, Q24H, Pawel Brown MD     albuterol (PROAIR HFA/PROVENTIL HFA/VENTOLIN HFA) 108 (90 Base) MCG/ACT inhaler 6 puff, 6 puff, Inhalation, Q6H, Pawel Brown MD, 6 puff at 11/09/21 0203     benzonatate (TESSALON) capsule 100 mg, 100 mg, Oral, TID PRN, Pawel Brown MD     remdesivir 200 mg in sodium chloride 0.9 % 250 mL intermittent infusion, 200 mg, Intravenous, Once, Last Rate: 250 mL/hr at 11/09/21 0210, 200 mg at 11/09/21 0210 **FOLLOWED BY** [COMPLETED] 0.9% sodium chloride BOLUS, 50 mL, Intravenous, Once, Pawel Brown MD, Last Rate: 100 mL/hr at 11/09/21 0211, 50 mL at 11/09/21 0211    Current Outpatient Medications:      Amphetamine-Dextroamphetamine (ADDERALL PO), Take 20 mg by mouth 2 times daily.  , Disp: , Rfl:      HYDROcodone-acetaminophen 5-325 MG per tablet, Take 1-2 tablets by mouth every 8 hours as needed for pain., Disp: 10 tablet, Rfl: 0     levonorgestrel-ethinyl estradiol (AVIANE/ALESSE/LESSINA) 0.1-20 MG-MCG tablet, Take 1 tablet by mouth daily, Disp: , Rfl:      Prenatal Vit-Fe Fumarate-FA (PRENATAL MULTIVITAMIN  PLUS IRON) 27-0.8 MG TABS, Take 1 tablet by mouth daily, Disp: , Rfl:     ALLERGIES:  Allergies   Allergen Reactions     Amoxicillin Rash     Morphine Hives     Pollen Extract-Tree Extract [Pollen Extract]      Sulfa Drugs Rash     Levofloxacin Hives, Itching and Rash     rash         FAMILY HISTORY:  Family History   Problem Relation Age of Onset     Cancer Brother         osteo carcoma     Genetic Disorder Brother         cleft lip/palate     Diabetes Father      Hypertension Father      Heart Disease Paternal  "Grandfather      Hypertension Paternal Grandfather      Heart Disease Maternal Grandfather      Hypertension Maternal Grandfather      Heart Disease Paternal Grandmother      Hypertension Paternal Grandmother      Hypertension Mother        SOCIAL HISTORY:   Social History     Socioeconomic History     Marital status:      Spouse name: Not on file     Number of children: Not on file     Years of education: Not on file     Highest education level: Not on file   Occupational History     Not on file   Tobacco Use     Smoking status: Former Smoker     Types: Cigarettes     Smokeless tobacco: Never Used     Tobacco comment: 5 per day   Substance and Sexual Activity     Alcohol use: Yes     Comment: occassionally     Drug use: No     Sexual activity: Yes     Partners: Male   Other Topics Concern     Parent/sibling w/ CABG, MI or angioplasty before 65F 55M? No   Social History Narrative     Not on file     Social Determinants of Health     Financial Resource Strain: Not on file   Food Insecurity: Not on file   Transportation Needs: Not on file   Physical Activity: Not on file   Stress: Not on file   Social Connections: Not on file   Intimate Partner Violence: Not on file   Housing Stability: Not on file     PHYSICAL EXAM:    Vitals: BP (!) 143/85   Pulse 107   Temp 99.5  F (37.5  C) (Oral)   Resp 17   Ht 1.854 m (6' 1\")   Wt 62.6 kg (138 lb)   SpO2 93%   BMI 18.21 kg/m     Physical Exam  Vitals and nursing note reviewed.   Constitutional:       General: She is not in acute distress.     Appearance: She is well-developed and normal weight. She is ill-appearing. She is not toxic-appearing or diaphoretic.      Interventions: She is not intubated.  HENT:      Head: Normocephalic.      Mouth/Throat:      Comments: Dry mucous membranes  Eyes:      Extraocular Movements: Extraocular movements intact.      Pupils: Pupils are equal, round, and reactive to light.   Cardiovascular:      Rate and Rhythm: Normal rate and " regular rhythm.      Pulses: Normal pulses.      Heart sounds: Normal heart sounds.   Pulmonary:      Effort: Pulmonary effort is normal. No tachypnea, bradypnea, accessory muscle usage or respiratory distress. She is not intubated.      Breath sounds: No stridor. Examination of the right-lower field reveals decreased breath sounds. Examination of the left-lower field reveals decreased breath sounds. Decreased breath sounds present. No wheezing, rhonchi or rales.   Chest:      Chest wall: No tenderness.   Abdominal:      Palpations: Abdomen is soft.      Tenderness: There is no abdominal tenderness.   Musculoskeletal:      Cervical back: Normal range of motion and neck supple.      Right lower leg: No tenderness. No edema.      Left lower leg: No tenderness. No edema.   Skin:     General: Skin is warm and dry.      Coloration: Skin is not cyanotic or pale.   Neurological:      General: No focal deficit present.      Mental Status: She is alert.   Psychiatric:         Mood and Affect: Mood normal.        LAB:  All pertinent labs reviewed and interpreted.  Labs Ordered and Resulted from Time of ED Arrival to Time of ED Departure   INFLUENZA A/B & SARS-COV2 PCR MULTIPLEX - Abnormal       Result Value    Influenza A target Negative      Influenza B target Negative      SARS CoV2 PCR Positive (*)    BASIC METABOLIC PANEL - Abnormal    Sodium 139      Potassium 2.7 (*)     Chloride 102      Carbon Dioxide (CO2) 27      Anion Gap 10      Urea Nitrogen 4 (*)     Creatinine 0.54 (*)     Calcium 9.0      Glucose 101      GFR Estimate >90     CBC WITH PLATELETS AND DIFFERENTIAL - Abnormal    WBC Count 2.8 (*)     RBC Count 3.65 (*)     Hemoglobin 13.6      Hematocrit 39.9       (*)     MCH 37.3 (*)     MCHC 34.1      RDW 13.0      Platelet Count 79 (*)    MAGNESIUM - Abnormal    Magnesium 1.2 (*)    DIFFERENTIAL - Abnormal    % Neutrophils 87      % Lymphocytes 7      % Monocytes 6      % Eosinophils 0      % Basophils  0      Absolute Neutrophils 2.4      Absolute Lymphocytes 0.2 (*)     Absolute Monocytes 0.2      Absolute Eosinophils 0.0      Absolute Basophils 0.0      RBC Morphology Confirmed RBC Indices      Platelet Assessment        Value: Automated Count Confirmed. Platelet morphology is normal.   D DIMER QUANTITATIVE - Abnormal    D-Dimer Quantitative 0.78 (*)    HEPATIC FUNCTION PANEL - Abnormal    Bilirubin Total 1.8 (*)     Bilirubin Direct 1.1 (*)     Protein Total 6.9      Albumin 3.1 (*)     Alkaline Phosphatase 196 (*)      (*)     ALT 92 (*)    TROPONIN I - Normal    Troponin I 0.01     PROCALCITONIN - Normal    Procalcitonin 0.33         RADIOLOGY:  CT Chest Pulmonary Embolism w Contrast   Final Result   IMPRESSION:   1.  No pulmonary emboli identified.   2.  Extensive groundglass infiltrates bilaterally consistent with COVID pneumonia.      Commonly reported imaging features of (COVID-19) pneumonia are present. Influenza pneumonia and organizing pneumonia as can be seen in the setting of drug toxicity and connective tissue disease can cause a similar imaging pattern.      Chest XR,  PA & LAT   Final Result   IMPRESSION: Bilateral patchy pulmonary infiltrates which can be seen with viral pneumonias. No pleural effusion. Heart size appears normal.        EKG:   Performed at: 22:40 on 08-Nov-2021  Impression: Nonspecific ST abnormality  Rate: 99 bpm  Rhythm: NSR  QRS Interval: 80 ms  QTc Interval: 350/451 ms  Comparison: No previous available for comparison.  I have independently reviewed and interpreted the EKG(s) documented above.     PROCEDURES:   Procedures       I, Maximilian Gallegos, am serving as a scribe to document services personally performed by Dr. Pawel Brown  based on my observation and the provider's statements to me. I, Pawel Brown MD attest that Maximilian Gallegos is acting in a scribe capacity, has observed my performance of the services and has documented them in accordance with my  direction.      Pawel Brown M.D.  Emergency Medicine  [facility] EMERGENCY DEPARTMENT  [facility] [facility] [address] [phone]  Dept: [phone]      Pawel Brown MD  11/09/21 0245

## 2021-11-09 NOTE — PROGRESS NOTES
Pt informed that her mother was trying to get a hold of her.  She stated she would call her.  Paper with message given to patient.

## 2021-11-09 NOTE — PROGRESS NOTES
[facility]    After midnight - Hospitalist Service    Assessment and Plan    Principal Problem:    Pneumonia due to 2019 novel coronavirus  Active Problems:    Smoker    Hypoxemia    Hypokalemia    Hypomagnesemia    Acute respiratory failure with hypoxia (H)    Devika Velázquez is a 36 year old old female with h/o SOB    # Confirmed COVID-19 infection    # Acute Hypoxic Respiratory Failure secondary to COVID-19 infection  # Viral Pneumonia secondary to COVID-19 infection     Symptom Onset November 1, 2021   Date of 1st Positive Test November 8, 2021   Vaccination Status Not Vaccinated, but interested/contemplative         - Admit to medical floor with continuous pulse ox, COVID-19 special precautions  - Oxygen: continue current support with HFNC at 40 L/min, 65%;   - Labs: standard COVID admission labs ordered (CBC with diff, CMP, retic count, LDH, troponin, BNP, CK, INR/PT, PTT, D-dimer, fibrinogen, antithrombin, ferritin, CRP, IL-6)   - Imaging: no additional imaging needed at this time  - Breathing treatments: albuterol inhaler four times a day scheduled and PRN; avoid nebulizers in favor of MDIs   - IV fluids: not indicated at this time  - Antibiotics: not indicated   - COVID-Focused Medications: Dexamethasone 6 mg x 10 days or until hospital discharge, started on November 8, 2021. AST more than 5 times upper limit of normal, hold off further doses of remdesivir. ALT is not and discussed with ID and will hold though for now.   - DVT Prophylaxis: at high risk of thrombotic complications due to COVID-19 (DDimer = 0.78 ug/mL FEU (Ref range: 0.00 - 0.50 ug/mL FEU) ).          - PROPHYLACTIC dosing: lovenox 40mg daily        - consider anticoag on discharge for 30 days & until return to normal mobility   - discussed with RT about increase in Oxygen need and then discussed with Dr. Jones and patient needs ICU bed and agreed with giving Tocilizumab 8mg/kg and then discussed with ID and will  give baricinub 4mg daily for 14 days.    - Discussed with patient the need for ICU placement which she understands and reviewed Tocilizumab/baricitinib and that its EAU not FDA approved.   - No ICU beds in the [facility] or surrounding area and will stay in ED for now    #Hypokalemia, hypomagnesemia  - potassium replacement protocol  - telemetry  - 2gm IV mag  - rechecking labs this morning      #Abnormal LFTs  Likely related to viral illness  Hold further doses of remdesivir and ID agreed   CT scan showed prominent fatty infiltration of liver  Monitor LFTs     #Thrombocytopenia  Likely related to viral illness  Monitor while on Lovenox DVT prophylaxis     #Tobacco abuse  Cessation education     #ADHD  On Adderall PTA        VTE prophylaxis:  Enoxaparin (Lovenox) SQ  DIET: reg  Drains/Lines: none  Weight bearing status: WBAT  Disposition/Barriers to discharge: pending decreased O2 and if ICU bed opens in area  Code Status: full    Subjective:  Increased to 40lpm 65%. Discussed with patient about ICU status and medication as above     B/P: 126/83, T: 99.5, P: 90, R: 26     PERTINENT LABS  1.8 (L)    \    12.9    /    79 (L)   N 87    L N/A    139    102    4 (L) /   ------------------------------------ 101   ALT 92 (H)    (H)    (H)   ALB 3.1 (L)   Ca 9.0  2.7 (LL)    27    0.54 (L) \    % RETIC N/A    LDH N/A  Troponin N/A    BNP N/A    CK N/A  INR 1.07   PTT 42 (H)    D-dimer 0.52 (H)    Fibrinogen 435    Antithrombin N/A  Ferritin N/A  CRP 7.1 (H)    IL-6 N/A  Recent Results (from the past 24 hour(s))   Chest XR,  PA & LAT    Narrative    EXAM: XR CHEST 2 VW  LOCATION: [facility]  DATE/TIME: 11/8/2021 7:55 PM    INDICATION: SOB, hypoxia  COMPARISON: None.      Impression    IMPRESSION: Bilateral patchy pulmonary infiltrates which can be seen with viral pneumonias. No pleural effusion. Heart size appears normal.   CT Chest Pulmonary Embolism w Contrast    Narrative     EXAM: CT CHEST PULMONARY EMBOLISM W CONTRAST  LOCATION: [facility]  DATE/TIME: 11/8/2021 11:34 PM    INDICATION: PE suspected, high prob. Fever, cough and hypoxia.  COMPARISON: None.  TECHNIQUE: CT chest pulmonary angiogram during arterial phase injection of IV contrast. Multiplanar reformats and MIP reconstructions were performed. Dose reduction techniques were used.   CONTRAST: ISOVUE 370 100ML    FINDINGS:  ANGIOGRAM CHEST: Pulmonary arteries are normal caliber and negative for pulmonary emboli. Thoracic aorta is negative for dissection. No CT evidence for right heart strain.    LUNGS AND PLEURA: Extensive peripheral groundglass pulmonary infiltrates are present bilaterally in a pattern very compatible with COVID pneumonia. No pleural effusion.    MEDIASTINUM/AXILLAE: Numerous tiny punctate calcifications with scattered small lymph nodes but no significant adenopathy.    CORONARY ARTERY CALCIFICATION: None.    UPPER ABDOMEN: Prominent fatty infiltration of liver.    MUSCULOSKELETAL: Normal.      Impression    IMPRESSION:  1.  No pulmonary emboli identified.  2.  Extensive groundglass infiltrates bilaterally consistent with COVID pneumonia.    Commonly reported imaging features of (COVID-19) pneumonia are present. Influenza pneumonia and organizing pneumonia as can be seen in the setting of drug toxicity and connective tissue disease can cause a similar imaging pattern.       Radha Almendarez MD  [facility] Medicine [facility]  [phone]

## 2021-11-09 NOTE — CONSULTS
"Infectious Disease Consultation:  Requesting MD:  Reason for consult:      HISTORY:  Unvax smoker, mom of children with recent viral sxs in with a week of fever and a shorter duration of SOB, cough which is worsening in just her time here so far.  as has been her oxygen needs.  CXR typical for covid, swab +.  Lfts a bit up.  Denies other medical hx.             Pertinent past history, past infectious disease history:  Tobaccoism, rest see hospitalist HP    Medications:  Reviewed prior to admission meds as applicable in chart review.  Current meds are reviewed in the EMR listed MAR.     ANTIBIOTICS:    Current:   Prior:   Allergy to:    SH/FH and  travel history(if applicable to consult):  Reviewed in chart, see hospitalist HP for additional detail.  Smokes 1/2ppd.  REVIEW OF SYSTEMS:  All other systems negative    EXAMINATION:  /81   Pulse 95   Temp 99  F (37.2  C) (Oral)   Resp 27   Ht 1.854 m (6' 1\")   Wt 62.6 kg (138 lb)   SpO2 94%   BMI 18.21 kg/m    Alert, awake  Vitals tabulated above, reviewed  HEENT:nl, face mask on 8 liters  Neck supple without lymphadenopathy  Sclera clear  CARDIOVASCULAR regular rate and rhythm, no murmur  Lungs CLEAR TO AUSCULTATION   Abdomen soft, NT/ND, absent HEPATOSPLENOMEGALY  Skin normal  Joints normal  Neurologic exam non focal  Wound:  NA        CLINICAL DATABASE FOR---LAB/MICRO/CULTURES/IMAGING STUDIEs:  Results for FARZANA THOMAS (MRN [id number]) as of 11/9/2021 12:24   Ref. Range 11/8/2021 21:45 11/8/2021 22:40 11/8/2021 23:02 11/8/2021 23:38 11/9/2021 08:45   Sodium Latest Ref Range: 136 - 145 mmol/L 139    141   Potassium Latest Ref Range: 3.5 - 5.0 mmol/L 2.7 (LL)    3.4 (L)   Chloride Latest Ref Range: 98 - 107 mmol/L 102    107   Carbon Dioxide Latest Ref Range: 22 - 31 mmol/L 27    22   Urea Nitrogen Latest Ref Range: 8 - 22 mg/dL 4 (L)    4 (L)   Creatinine Latest Ref Range: 0.60 - 1.10 mg/dL 0.54 (L)    0.50 (L)   GFR Estimate Latest Ref Range: >60 " mL/min/1.73m2 >90    >90   Calcium Latest Ref Range: 8.5 - 10.5 mg/dL 9.0    8.3 (L)   Anion Gap Latest Ref Range: 5 - 18 mmol/L 10    12   Magnesium Latest Ref Range: 1.8 - 2.6 mg/dL 1.2 (L)    1.8   Albumin Latest Ref Range: 3.5 - 5.0 g/dL 3.1 (L)    2.9 (L)   Protein Total Latest Ref Range: 6.0 - 8.0 g/dL 6.9    6.3   Bilirubin Total Latest Ref Range: 0.0 - 1.0 mg/dL 1.8 (H)    1.6 (H)   Alkaline Phosphatase Latest Ref Range: 45 - 120 U/L 196 (H)    186 (H)   ALT Latest Ref Range: 0 - 45 U/L 92 (H)    76 (H)   AST Latest Ref Range: 0 - 40 U/L 302 (H)    227 (H)   Bilirubin Direct Latest Ref Range: <=0.5 mg/dL 1.1 (H)       CRP Latest Ref Range: 0.0-<0.8 mg/dL 7.1 (H)    6.4 (H)   Ferritin Latest Ref Range: 10 - 130 ng/mL   3,372 (H)     Lactate Dehydrogenase Latest Ref Range: 125 - 220 U/L     457 (H)   Procalcitonin Latest Ref Range: 0.00 - 0.49 ng/mL   0.33     Troponin I Latest Ref Range: 0.00 - 0.29 ng/mL 0.01       Glucose Latest Ref Range: 70 - 125 mg/dL 101    136 (H)   WBC Latest Ref Range: 4.0 - 11.0 10e3/uL 2.8 (L)    1.8 (L)   Hemoglobin Latest Ref Range: 11.7 - 15.7 g/dL 13.6    12.9   Hematocrit Latest Ref Range: 35.0 - 47.0 % 39.9    38.4   Platelet Count Latest Ref Range: 150 - 450 10e3/uL 79 (L)    79 (L)   RBC Count Latest Ref Range: 3.80 - 5.20 10e6/uL 3.65 (L)    3.47 (L)   MCV Latest Ref Range: 78 - 100 fL 109 (H)    111 (H)   MCH Latest Ref Range: 26.5 - 33.0 pg 37.3 (H)    37.2 (H)   MCHC Latest Ref Range: 31.5 - 36.5 g/dL 34.1    33.6   RDW Latest Ref Range: 10.0 - 15.0 % 13.0    13.0   % Neutrophils Latest Units: % 87       % Lymphocytes Latest Units: % 7       % Monocytes Latest Units: % 6       % Eosinophils Latest Units: % 0       % Basophils Latest Units: % 0       Absolute Basophils Latest Ref Range: 0.0 - 0.2 10e3/uL 0.0       Absolute Neutrophil Latest Ref Range: 1.6 - 8.3 10e3/uL 2.4       Absolute Lymphocytes Latest Ref Range: 0.8 - 5.3 10e3/uL 0.2 (L)       Absolute Monocytes  Latest Ref Range: 0.0 - 1.3 10e3/uL 0.2       Absolute Eosinophils Latest Ref Range: 0.0 - 0.7 10e3/uL 0.0       RBC Morphology Unknown Confirmed RBC Indices       Platelet Morphology Latest Ref Range: Automated Count Confirmed. Platelet morphology is normal.  Automated Count Confirmed. Platelet morphology is normal.       INR Latest Ref Range: 0.90 - 1.15    1.07     PTT Latest Ref Range: 22 - 38 Seconds     42 (H)   Fibrinogen Latest Ref Range: 170 - 490 mg/dL   441  435   D-Dimer Quantitative Latest Ref Range: 0.00 - 0.50 ug/mL FEU   0.78 (H)  0.52 (H)   ABO/Rh(D) Unknown     O POS   SPECIMEN EXPIRATION DATE Unknown     [id number]   CT CHEST PULMONARY EMBOLISM W CONTRAST Unknown    Rpt    ECG 12-LEAD WITH MUSE - [facility] Unknown  Rpt        CXR not impressive  CT already has apical emphysema then mid to basilar diffuse infiltration    IMPRESSION:  Covid  Mild transaminitis  Smoker  Unvaccinated host    PLAN:  Favor baricitinib over toci   Remdesivir not likely clinically useful  Ok with steroids  D/W pt  Vaccination if desired should be 21 days post recovery.         RASHAAD URBINA MD  [facility]  Office [phone]

## 2021-11-09 NOTE — ED NOTES
Pt has remained stable since 0330 on the high flow oxygen mask, advised pt nurse would check back at 0400-pt was sleeping, continued to monitor pt's oxygen level, nicotine patch held until pt wakes, purewick was placed so pt does not get out of bed due to oxygen level dropping into the 70's.

## 2021-11-09 NOTE — H&P
[facility]    History and Physical - Hospitalist Service       Date of Admission:  11/8/2021    Assessment & Plan      Devika Velázquez is a 36 year old female admitted on 11/8/2021. She she has had intermittent fevers, cough, change in taste, decreased oral intake progressing since November 1, 2021.    # Confirmed COVID-19 infection    # Acute Hypoxic Respiratory Failure secondary to COVID-19 infection  # Viral Pneumonia secondary to COVID-19 infection     Symptom Onset November 1, 2021   Date of 1st Positive Test November 8, 2021   Vaccination Status Not Vaccinated, but interested/contemplative       - Admit to medical floor with continuous pulse ox, COVID-19 special precautions  - Oxygen: continue current support with HFNC at 40 L/min, 60%; titrate to keep SpO2 between 90-96%. Patient did not tolerate high flow nasal cannula, will try oxygen mask.  - Labs: standard COVID admission labs ordered (CBC with diff, CMP, retic count, LDH, troponin, BNP, CK, INR/PT, PTT, D-dimer, fibrinogen, antithrombin, ferritin, CRP, IL-6)   - Imaging: no additional imaging needed at this time  - Breathing treatments: albuterol inhaler four times a day scheduled and PRN; avoid nebulizers in favor of MDIs   - IV fluids: not indicated at this time  - Antibiotics: not indicated   - COVID-Focused Medications: Dexamethasone 6 mg x 10 days or until hospital discharge, started on November 8, 2021. AST more than 5 times upper limit of normal, hold off further doses of remdesivir. We will consult ID.  - DVT Prophylaxis: at high risk of thrombotic complications due to COVID-19 (DDimer = 0.78 ug/mL FEU (Ref range: 0.00 - 0.50 ug/mL FEU) ).          - PROPHYLACTIC dosing: lovenox 40mg daily        - consider anticoag on discharge for 30 days & until return to normal mobility    #Hypokalemia, hypomagnesemia  Secondary to decreased oral intake  Replaced in the ED, will monitor    #Abnormal LFTs  Likely related to viral  illness  Hold further doses of remdesivir  CT scan showed prominent fatty infiltration of liver  Monitor LFTs    #Thrombocytopenia  Likely related to viral illness  Monitor while on Lovenox DVT prophylaxis    #Tobacco abuse  Cessation education    #ADHD  On Adderall PTA     Diet: Combination Diet Regular Diet Adult    DVT Prophylaxis: Enoxaparin (Lovenox) SQ  Parrish Catheter: Not present  Central Lines: None  Code Status: Full Code      Clinically Significant Risk Factors Present on Admission        # Hypokalemia: K = 2.7 mmol/L (Ref range: 3.5 - 5.0 mmol/L) on admission, will replace as needed    # Hypomagnesemia: Mg = 1.2 mg/dL (Ref range: 1.8 - 2.6 mg/dL) on admission, will replace as needed    # Thrombocytopenia: Plts = 79 10e3/uL (Ref range: 150 - 450 10e3/uL) on admission, will monitor for bleeding      Disposition Plan   Expected discharge:  more than 2 midnights recommended to prior living arrangement once O2 use less than 2 liters/minute.     The patient's care was discussed with the Patient.    Dariel Verduzco MD  [facility]  Securely message with the Vocera Web Console (learn more here)  Text page via [employer] Paging/Directory        ______________________________________________________________________    Chief Complaint   Cough, fevers for 1 week    History is obtained from the patient, electronic health record and emergency department physician    History of Present Illness   Devika Velázquez is a 36 year old female who was brought to the emergency department by ambulance for evaluation of fevers, cough for 1 week. Past medical history of tobacco abuse, ADHD, chronic low back pain. Patient states her children had febrile illness in the past couple weeks. She started having symptoms on November 1, 2021 mostly a dry cough but at times a thick clear phlegm. She smokes half a pack of cigarettes daily since age 18 but not over the last few days. He drinks alcohol occasionally and  denies illicit drugs. Her taste is different so she has had decreased appetite and oral intake. She denies nausea, vomiting, abdominal pain, diarrhea, melena or hematochezia. Upon ED arrival, temperature 100.5  F, heart rate 118 bpm. Her oxygen saturation dropped with movement so she was placed on oxygen 4 L. However, further dropped down to 77% when ambulated to the bathroom. High flow nasal cannula was attempted but she felt like she was going to have a panic attack.    Review of Systems    The 10 point Review of Systems is negative other than noted in the HPI or here.     Past Medical History    I have reviewed this patient's medical history and updated it with pertinent information if needed.   Past Medical History:   Diagnosis Date     ADHD      Allergic state        Past Surgical History   I have reviewed this patient's surgical history and updated it with pertinent information if needed.  Past Surgical History:   Procedure Laterality Date     C/SECTION, LOW TRANSVERSE  2009    Breech     D & C       LAPAROTOMY EXPLORATORY  2012    endometriosis     SURGICAL HISTORY OF -       oral surgery       Social History   I have reviewed this patient's social history and updated it with pertinent information if needed.  Social History     Tobacco Use     Smoking status: Former Smoker     Types: Cigarettes     Smokeless tobacco: Never Used     Tobacco comment: 5 per day   Substance Use Topics     Alcohol use: Yes     Comment: occassionally     Drug use: No       Family History   I have reviewed this patient's family history and updated it with pertinent information if needed.  Family History   Problem Relation Age of Onset     Cancer Brother         osteo carcoma     Genetic Disorder Brother         cleft lip/palate     Diabetes Father      Hypertension Father      Heart Disease Paternal Grandfather      Hypertension Paternal Grandfather      Heart Disease Maternal Grandfather      Hypertension Maternal Grandfather       Heart Disease Paternal Grandmother      Hypertension Paternal Grandmother      Hypertension Mother        Prior to Admission Medications   Prior to Admission Medications   Prescriptions Last Dose Informant Patient Reported? Taking?   Amphetamine-Dextroamphetamine (ADDERALL PO)   Yes No   Sig: Take 20 mg by mouth 2 times daily.     HYDROcodone-acetaminophen 5-325 MG per tablet   No No   Sig: Take 1-2 tablets by mouth every 8 hours as needed for pain.   Prenatal Vit-Fe Fumarate-FA (PRENATAL MULTIVITAMIN  PLUS IRON) 27-0.8 MG TABS   Yes No   Sig: Take 1 tablet by mouth daily   levonorgestrel-ethinyl estradiol (AVIANE/ALESSE/LESSINA) 0.1-20 MG-MCG tablet   Yes No   Sig: Take 1 tablet by mouth daily      Facility-Administered Medications: None     Allergies   Allergies   Allergen Reactions     Amoxicillin Rash     Morphine Hives     Pollen Extract-Tree Extract [Pollen Extract]      Sulfa Drugs Rash     Levofloxacin Hives, Itching and Rash     rash         Physical Exam   Vital Signs: Temp: 99.5  F (37.5  C) Temp src: Oral BP: (!) 143/85 Pulse: 107   Resp: 17 SpO2: 93 % O2 Device: High Flow Nasal Cannula (HFNC) Oxygen Delivery: 40 LPM  Weight: 138 lbs 0 oz    Constitutional: awake, alert, cooperative and mild distress  Eyes: pupils equal, round and reactive to light and extra-ocular muscles intact  ENT: normocepalic, without obvious abnormality, atramatic  Hematologic / Lymphatic: no cervical lymphadenopathy and no supraclavicular lymphadenopathy  Respiratory: tachypneic, mild air exchange, no retractions and rhonchi diffuse  Cardiovascular: regular rate and rhythm and normal S1 and S2  GI: normal bowel sounds, soft, non-distended and non-tender  Skin: Warm, diaphoretic  Musculoskeletal: no lower extremity pitting edema present  there is no redness, warmth, or swelling of the joints  Neurologic: Mental Status Exam:  Level of Alertness:   awake  Orientation:   person, place, time  Motor Exam:  moves all extremities well  and symmetrically  Neuropsychiatric: Affect: anxious    Data   Data reviewed today: I reviewed all medications, new labs and imaging results over the last 24 hours. I personally reviewed the EKG tracing showing Sinus rhythm at 99 bpm, nonspecific ST waves, no previous EKG to compare.    Recent Labs   Lab 11/08/21  2302 11/08/21  2145   WBC  --  2.8*   HGB  --  13.6   MCV  --  109*   PLT  --  79*   INR 1.07  --    NA  --  139   POTASSIUM  --  2.7*   CHLORIDE  --  102   CO2  --  27   BUN  --  4*   CR  --  0.54*   ANIONGAP  --  10   KOKI  --  9.0   GLC  --  101   ALBUMIN  --  3.1*   PROTTOTAL  --  6.9   BILITOTAL  --  1.8*   ALKPHOS  --  196*   ALT  --  92*   AST  --  302*     Recent Results (from the past 24 hour(s))   Chest XR,  PA & LAT    Narrative    EXAM: XR CHEST 2 VW  LOCATION: [facility]  DATE/TIME: 11/8/2021 7:55 PM    INDICATION: SOB, hypoxia  COMPARISON: None.      Impression    IMPRESSION: Bilateral patchy pulmonary infiltrates which can be seen with viral pneumonias. No pleural effusion. Heart size appears normal.   CT Chest Pulmonary Embolism w Contrast    Narrative    EXAM: CT CHEST PULMONARY EMBOLISM W CONTRAST  LOCATION: [facility]  DATE/TIME: 11/8/2021 11:34 PM    INDICATION: PE suspected, high prob. Fever, cough and hypoxia.  COMPARISON: None.  TECHNIQUE: CT chest pulmonary angiogram during arterial phase injection of IV contrast. Multiplanar reformats and MIP reconstructions were performed. Dose reduction techniques were used.   CONTRAST: ISOVUE 370 100ML    FINDINGS:  ANGIOGRAM CHEST: Pulmonary arteries are normal caliber and negative for pulmonary emboli. Thoracic aorta is negative for dissection. No CT evidence for right heart strain.    LUNGS AND PLEURA: Extensive peripheral groundglass pulmonary infiltrates are present bilaterally in a pattern very compatible with COVID pneumonia. No pleural effusion.    MEDIASTINUM/AXILLAE: Numerous tiny  punctate calcifications with scattered small lymph nodes but no significant adenopathy.    CORONARY ARTERY CALCIFICATION: None.    UPPER ABDOMEN: Prominent fatty infiltration of liver.    MUSCULOSKELETAL: Normal.      Impression    IMPRESSION:  1.  No pulmonary emboli identified.  2.  Extensive groundglass infiltrates bilaterally consistent with COVID pneumonia.    Commonly reported imaging features of (COVID-19) pneumonia are present. Influenza pneumonia and organizing pneumonia as can be seen in the setting of drug toxicity and connective tissue disease can cause a similar imaging pattern.

## 2021-11-09 NOTE — CONSULTS
Care Management Initial Consult    General Information  Assessment completed with: VM-chart review,    Type of CM/SW Visit: Initial Assessment    Primary Care Provider verified and updated as needed: Yes   Readmission within the last 30 days: no previous admission in last 30 days      Reason for Consult: discharge planning  Advance Care Planning:            Communication Assessment  Patient's communication style: spoken language (English or Bilingual)             Cognitive  Cognitive/Neuro/Behavioral: WDL                      Living Environment:   People in home:       Current living Arrangements:        Able to return to prior arrangements: yes       Family/Social Support:  Care provided by: self  Provides care for:       Parent(s)          Description of Support System: Supportive         Current Resources:   Patient receiving home care services: No     Community Resources: None  Equipment currently used at home: none  Supplies currently used at home: None    Employment/Financial:  Employment Status:          Financial Concerns:             Lifestyle & Psychosocial Needs:  Social Determinants of Health     Tobacco Use: Medium Risk     Smoking Tobacco Use: Former Smoker     Smokeless Tobacco Use: Never Used   Alcohol Use: Not on file   Financial Resource Strain: Not on file   Food Insecurity: Not on file   Transportation Needs: Not on file   Physical Activity: Not on file   Stress: Not on file   Social Connections: Not on file   Intimate Partner Violence: Not on file   Depression: Not on file   Housing Stability: Not on file       Functional Status:  Prior to admission patient needed assistance:   Dependent ADLs:: Independent  Dependent IADLs:: Independent  Assesssment of Functional Status: Not at baseline with ADL Functioning    Mental Health Status:          Chemical Dependency Status:                Values/Beliefs:  Spiritual, Cultural Beliefs, Gnosticist Practices, Values that affect care:                  Additional Information:    Patient positive for Covid. Info per chart review.     Patient independent at baseline. Her mom is supportive and primary contact. Patient needing ICU for Covid pneumonia. On high level 02 support.     Discharge needs to be determined. Most likely no needs depending on progression of care.     Ly Lerner, KIEL, CM, JAN

## 2021-11-09 NOTE — PROGRESS NOTES
Pt's mother called stating she was upset that nurse did not call her back.  Informed her that the patient was given her message.  Informed pt that her mother was trying to get information.  Pt stated she would call her.

## 2021-11-09 NOTE — PHARMACY-ADMISSION MEDICATION HISTORY
Pharmacy Note - Admission Medication History    Pertinent Provider Information: N/A     ______________________________________________________________________    Prior To Admission (PTA) med list completed and updated in EMR.       PTA Med List   Medication Sig Last Dose     levonorgestrel-ethinyl estradiol (AVIANE/ALESSE/LESSINA) 0.1-20 MG-MCG tablet Take 1 tablet by mouth At Bedtime  11/5/2021 at PM       Information source(s): Patient and CareEverywhere/SureScripts  Method of interview communication: iPad    Summary of Changes to PTA Med List  New: N/A  Discontinued: Adderall, Norco, prenatal  Changed: N/A    Patient was asked about OTC/herbal products specifically.  PTA med list reflects this.    In the past week, patient estimated taking medication this percent of the time:  50-90% due to illness.    Allergies were reviewed, assessed, and updated with the patient.      Patient does not use any multi-dose medications prior to admission.    The information provided in this note is only as accurate as the sources available at the time of the update(s).    Thank you for the opportunity to participate in the care of this patient.    Luther White [facility]  11/9/2021 8:35 AM

## 2021-11-09 NOTE — PROGRESS NOTES
Patient is coved positive and required high flow. Current settings: 40 lpm/60% FIO2. Pt refused the nasal cannula which come with the high flow so the flow and O2 is provided via mask. The flow and FIO2 are not accurate. Pt is now sating 94%. RT following.    Rey Ng, RT

## 2021-11-09 NOTE — PROGRESS NOTES
RESPIRATORY CARE NOTE     Pt. remains on HFNC 50L, 65-70% sats 90-94%, BS diminished, RR 18-22. Encourage pt. to self prone, titrate oxygen as tolerated, RT following       Evelyn Thomson, RT

## 2021-11-09 NOTE — ED TRIAGE NOTES
Pt presents via [facility] for fever, cough, hypoxia. Fever began 1 week ago. Pt developed cough, hypoxia. Pt was found to have O2 sats in upper 80's upon arrival. Pt placed on 3L of O2 at 94% sats. Pt was placed on doxycyline for supposed sinus infection. Pt did not complete course. Pt is unvaccinated.     EMS placed IV, giving 500 NS bolus. Will order CXR in triage and do COVID swab    Pt was given 500 Acetaminophen for fever (100.1). Pt was not taking antipyretics at home.

## 2021-11-09 NOTE — ED NOTES
Pt ambulated to the bathroom and oxygen level dropped to 77%, pt will use bedside commode, placed pt on 6L NC and oxygen level now at 90%.

## 2021-11-10 LAB
ANION GAP SERPL CALCULATED.3IONS-SCNC: 9 MMOL/L (ref 5–18)
AT III ACT/NOR PPP CHRO: 45 % (ref 85–135)
BUN SERPL-MCNC: 10 MG/DL (ref 8–22)
C REACTIVE PROTEIN LHE: 3.6 MG/DL (ref 0–0.8)
CALCIUM SERPL-MCNC: 8.5 MG/DL (ref 8.5–10.5)
CHLORIDE BLD-SCNC: 107 MMOL/L (ref 98–107)
CO2 SERPL-SCNC: 26 MMOL/L (ref 22–31)
CREAT SERPL-MCNC: 0.53 MG/DL (ref 0.6–1.1)
D DIMER PPP FEU-MCNC: 0.64 UG/ML FEU (ref 0–0.5)
ERYTHROCYTE [DISTWIDTH] IN BLOOD BY AUTOMATED COUNT: 12.9 % (ref 10–15)
FIBRINOGEN PPP-MCNC: 356 MG/DL (ref 170–490)
GFR SERPL CREATININE-BSD FRML MDRD: >90 ML/MIN/1.73M2
GLUCOSE BLD-MCNC: 117 MG/DL (ref 70–125)
HCT VFR BLD AUTO: 36.9 % (ref 35–47)
HGB BLD-MCNC: 12.4 G/DL (ref 11.7–15.7)
IL6 SERPL-MCNC: 11 PG/ML
MAGNESIUM SERPL-MCNC: 2 MG/DL (ref 1.8–2.6)
MCH RBC QN AUTO: 37.6 PG (ref 26.5–33)
MCHC RBC AUTO-ENTMCNC: 33.6 G/DL (ref 31.5–36.5)
MCV RBC AUTO: 112 FL (ref 78–100)
PLATELET # BLD AUTO: 89 10E3/UL (ref 150–450)
POTASSIUM BLD-SCNC: 3.3 MMOL/L (ref 3.5–5)
RBC # BLD AUTO: 3.3 10E6/UL (ref 3.8–5.2)
SODIUM SERPL-SCNC: 142 MMOL/L (ref 136–145)
WBC # BLD AUTO: 3 10E3/UL (ref 4–11)

## 2021-11-10 PROCEDURE — 250N000013 HC RX MED GY IP 250 OP 250 PS 637: Performed by: INTERNAL MEDICINE

## 2021-11-10 PROCEDURE — 999N000215 HC STATISTIC HFNC ADULT NON-CPAP

## 2021-11-10 PROCEDURE — 86141 C-REACTIVE PROTEIN HS: CPT | Performed by: HOSPITALIST

## 2021-11-10 PROCEDURE — 250N000011 HC RX IP 250 OP 636: Performed by: HOSPITALIST

## 2021-11-10 PROCEDURE — 250N000011 HC RX IP 250 OP 636: Performed by: INTERNAL MEDICINE

## 2021-11-10 PROCEDURE — 200N000001 HC R&B ICU

## 2021-11-10 PROCEDURE — 80048 BASIC METABOLIC PNL TOTAL CA: CPT | Performed by: INTERNAL MEDICINE

## 2021-11-10 PROCEDURE — 85384 FIBRINOGEN ACTIVITY: CPT | Performed by: HOSPITALIST

## 2021-11-10 PROCEDURE — 85379 FIBRIN DEGRADATION QUANT: CPT | Performed by: HOSPITALIST

## 2021-11-10 PROCEDURE — 250N000013 HC RX MED GY IP 250 OP 250 PS 637: Performed by: NURSE PRACTITIONER

## 2021-11-10 PROCEDURE — 83735 ASSAY OF MAGNESIUM: CPT | Performed by: INTERNAL MEDICINE

## 2021-11-10 PROCEDURE — 250N000012 HC RX MED GY IP 250 OP 636 PS 637: Performed by: HOSPITALIST

## 2021-11-10 PROCEDURE — 250N000013 HC RX MED GY IP 250 OP 250 PS 637: Performed by: HOSPITALIST

## 2021-11-10 PROCEDURE — 999N000157 HC STATISTIC RCP TIME EA 10 MIN

## 2021-11-10 PROCEDURE — 36415 COLL VENOUS BLD VENIPUNCTURE: CPT | Performed by: HOSPITALIST

## 2021-11-10 PROCEDURE — 85027 COMPLETE CBC AUTOMATED: CPT | Performed by: HOSPITALIST

## 2021-11-10 PROCEDURE — 99231 SBSQ HOSP IP/OBS SF/LOW 25: CPT | Performed by: INTERNAL MEDICINE

## 2021-11-10 PROCEDURE — 99291 CRITICAL CARE FIRST HOUR: CPT | Performed by: NURSE PRACTITIONER

## 2021-11-10 RX ORDER — POTASSIUM CHLORIDE 1500 MG/1
20 TABLET, EXTENDED RELEASE ORAL ONCE
Status: COMPLETED | OUTPATIENT
Start: 2021-11-10 | End: 2021-11-10

## 2021-11-10 RX ORDER — DIPHENOXYLATE HCL/ATROPINE 2.5-.025MG
1 TABLET ORAL 4 TIMES DAILY PRN
Status: DISCONTINUED | OUTPATIENT
Start: 2021-11-10 | End: 2021-11-15 | Stop reason: HOSPADM

## 2021-11-10 RX ADMIN — ALBUTEROL SULFATE 6 PUFF: 90 AEROSOL, METERED RESPIRATORY (INHALATION) at 01:26

## 2021-11-10 RX ADMIN — ALBUTEROL SULFATE 6 PUFF: 90 AEROSOL, METERED RESPIRATORY (INHALATION) at 07:55

## 2021-11-10 RX ADMIN — DEXAMETHASONE 6 MG: 2 TABLET ORAL at 13:12

## 2021-11-10 RX ADMIN — ALBUTEROL SULFATE 6 PUFF: 90 AEROSOL, METERED RESPIRATORY (INHALATION) at 19:50

## 2021-11-10 RX ADMIN — POTASSIUM CHLORIDE 20 MEQ: 1500 TABLET, EXTENDED RELEASE ORAL at 06:59

## 2021-11-10 RX ADMIN — BENZONATATE 100 MG: 100 CAPSULE ORAL at 17:42

## 2021-11-10 RX ADMIN — NICOTINE 1 PATCH: 14 PATCH, EXTENDED RELEASE TRANSDERMAL at 09:46

## 2021-11-10 RX ADMIN — FAMOTIDINE 20 MG: 20 TABLET, FILM COATED ORAL at 19:50

## 2021-11-10 RX ADMIN — FAMOTIDINE 20 MG: 20 TABLET, FILM COATED ORAL at 07:56

## 2021-11-10 RX ADMIN — BENZONATATE 100 MG: 100 CAPSULE ORAL at 07:56

## 2021-11-10 RX ADMIN — ALBUTEROL SULFATE 6 PUFF: 90 AEROSOL, METERED RESPIRATORY (INHALATION) at 13:11

## 2021-11-10 RX ADMIN — BARICITINIB 4 MG: 2 TABLET, FILM COATED ORAL at 09:45

## 2021-11-10 RX ADMIN — ENOXAPARIN SODIUM 30 MG: 30 INJECTION SUBCUTANEOUS at 06:59

## 2021-11-10 RX ADMIN — ONDANSETRON 4 MG: 2 INJECTION INTRAMUSCULAR; INTRAVENOUS at 10:32

## 2021-11-10 RX ADMIN — DIPHENOXYLATE HYDROCHLORIDE AND ATROPINE SULFATE 1 TABLET: 2.5; .025 TABLET ORAL at 17:42

## 2021-11-10 RX ADMIN — BENZONATATE 100 MG: 100 CAPSULE ORAL at 11:47

## 2021-11-10 ASSESSMENT — ACTIVITIES OF DAILY LIVING (ADL)
ADLS_ACUITY_SCORE: 7
ADLS_ACUITY_SCORE: 5
ADLS_ACUITY_SCORE: 7
ADLS_ACUITY_SCORE: 5
ADLS_ACUITY_SCORE: 7
ADLS_ACUITY_SCORE: 5
ADLS_ACUITY_SCORE: 7

## 2021-11-10 ASSESSMENT — MIFFLIN-ST. JEOR: SCORE: 1362.21

## 2021-11-10 NOTE — PROGRESS NOTES
Discussed with ICU team still requiring critical ICU monitoring and on HFNC. HMS will follow peripherally for now.

## 2021-11-10 NOTE — CONSULTS
Critical Care consult note      11/09/2021    Name: Devika Velázquez MRN#: [id number]   Age: 36 year old YOB: 1985                    Problem List:   Principal Problem:    Pneumonia due to 2019 novel coronavirus  Active Problems:    Smoker    Hypoxemia    Hypokalemia    Hypomagnesemia    Acute respiratory failure with hypoxia (H)    LFT elevation  Thrombocytopenia  Lymphopenia        HPI:     36-year-old female unvaccinated who presents with Covid symptoms since November 1.  Tested positive on the eighth.  Complains of fever and shortness of breath.  Nonproductive cough.  Increased O2 needs so she was placed on high flow nasal cannula and being admitted to the ICU.  Past medical history significant for tobacco abuse and ADHD.      Assessment and plan :       I have personally reviewed the labs, imaging studies, cultures and discussed the case with referring physician and consulting physicians.     My assessment and plan by system for this patient is as follows:    Neurology/Psychiatry:   No issues      Cardiovascular:   Hemodynamically stable.  Not on pressors.      Pulmonary/Ventilator Management:   Acute hypoxic respiratory failure secondary to COVID-19 pneumonia.  High flow nasal cannula.  Titrate to O2 sats over 92%      GI and Nutrition :   Nutrition as tolerated.  Elevated LFTs.  Most likely due to viral illness.    Renal/Fluids/Electrolytes:     - monitor function and electrolytes as needed with replacement per ICU protocols. - generally avoid nephrotoxic agents such as NSAID, IV contrast unless specifically required  - adjust medications as needed for renal clearance  - follow I/O's as appropriate.    Infectious Disease:   COVID-19 pneumonia.  Evaluated by ID.  Did not feel that she will benefit from remdesivir at this point since she has been having symptoms for over a week.  Continue baricitinib.  Continue dexamethasone      Endocrine:     - ICU insulin protocol, goal sugar  <180      Hematology/Oncology:   Leukopenia.  Monitor.  Most likely due to viral illness    Thrombocytopenia.  Again most likely due to viral illness.    I will hold Lovenox tonight.  She really got a dose this morning.  Clinically she should be on a 0.5 mg subcu twice daily according to our protocol but with platelets of 79K I would like to see what her counts are going before we redose her.                 Medical History:     Past Medical History:   Diagnosis Date     ADHD      Allergic state      Past Surgical History:   Procedure Laterality Date     C/SECTION, LOW TRANSVERSE  2009    Breech     D & C       LAPAROTOMY EXPLORATORY  2012    endometriosis     SURGICAL HISTORY OF -       oral surgery     Social History     Socioeconomic History     Marital status:      Spouse name: Not on file     Number of children: Not on file     Years of education: Not on file     Highest education level: Not on file   Occupational History     Not on file   Tobacco Use     Smoking status: Former Smoker     Types: Cigarettes     Smokeless tobacco: Never Used     Tobacco comment: 5 per day   Substance and Sexual Activity     Alcohol use: Yes     Comment: occassionally     Drug use: No     Sexual activity: Yes     Partners: Male   Other Topics Concern     Parent/sibling w/ CABG, MI or angioplasty before 65F 55M? No   Social History Narrative     Not on file     Social Determinants of Health     Financial Resource Strain: Not on file   Food Insecurity: Not on file   Transportation Needs: Not on file   Physical Activity: Not on file   Stress: Not on file   Social Connections: Not on file   Intimate Partner Violence: Not on file   Housing Stability: Not on file        Allergies   Allergen Reactions     Amoxicillin Rash     Morphine Hives     Pollen Extract-Tree Extract [Pollen Extract]      Sulfa Drugs Rash     Levofloxacin Hives, Itching and Rash     rash                Graves Medications:       albuterol  6 puff Inhalation Q6H      baricitinib  4 mg Oral Daily     benzonatate  100 mg Oral TID     dexamethasone  6 mg Oral Daily     enoxaparin ANTICOAGULANT  40 mg Subcutaneous Q24H     famotidine  20 mg Oral BID     nicotine  1 patch Transdermal Daily     nicotine   Transdermal Q8H     sodium chloride (PF)  3 mL Intracatheter Q8H       - MEDICATION INSTRUCTIONS -          Home Meds  No current facility-administered medications on file prior to encounter.  levonorgestrel-ethinyl estradiol (AVIANE/ALESSE/LESSINA) 0.1-20 MG-MCG tablet, Take 1 tablet by mouth At Bedtime                Physical Examination:   Temp:  [98.1  F (36.7  C)-99.5  F (37.5  C)] 98.1  F (36.7  C)  Pulse:  [] 107  Resp:  [15-40] 27  BP: (117-153)/() 131/76  FiO2 (%):  [60 %-70 %] 65 %  SpO2:  [77 %-100 %] 93 %    Intake/Output Summary (Last 24 hours) at 11/9/2021 2028  Last data filed at 11/9/2021 0325  Gross per 24 hour   Intake 500 ml   Output --   Net 500 ml     Wt Readings from Last 4 Encounters:   11/09/21 53.7 kg (118 lb 6.4 oz)   01/13/19 59 kg (130 lb)   09/21/15 70.3 kg (155 lb)   01/01/14 63.5 kg (140 lb)     BP - Mean:  [] 99  FiO2 (%): 65 %  Resp: 27    No lab results found in last 7 days.    GEN: no acute distress   HEENT: head ncat, sclera anicteric, OP patent, trachea midline   PULM: unlabored, clear anteriorly    CV/COR: RRR S1S2 no gallop,  No rub, no murmur  ABD: soft nontender, hypoactive bowel sounds, no mass  EXT: No edema, warm  NEURO: grossly intact  SKIN: no obvious rash  LINES: clean, dry intact         Data:   All data and imaging reviewed     ROUTINE ICU LABS (Last four results)  CMP  Recent Labs   Lab 11/09/21  0845 11/08/21  2145    139   POTASSIUM 3.4* 2.7*   CHLORIDE 107 102   CO2 22 27   ANIONGAP 12 10   * 101   BUN 4* 4*   CR 0.50* 0.54*   GFRESTIMATED >90 >90   KOKI 8.3* 9.0   MAG 1.8 1.2*   PROTTOTAL 6.3 6.9   ALBUMIN 2.9* 3.1*   BILITOTAL 1.6* 1.8*   ALKPHOS 186* 196*   * 302*   ALT 76* 92*      CBC  Recent Labs   Lab 11/09/21  0845 11/08/21  2145   WBC 1.8* 2.8*   RBC 3.47* 3.65*   HGB 12.9 13.6   HCT 38.4 39.9   * 109*   MCH 37.2* 37.3*   MCHC 33.6 34.1   RDW 13.0 13.0   PLT 79* 79*     INR  Recent Labs   Lab 11/08/21  2302   INR 1.07     Arterial Blood GasNo lab results found in last 7 days.    All cultures:  No results for input(s): CULT in the last 168 hours.  Recent Results (from the past 24 hour(s))   CT Chest Pulmonary Embolism w Contrast    Narrative    EXAM: CT CHEST PULMONARY EMBOLISM W CONTRAST  LOCATION: [facility]  DATE/TIME: 11/8/2021 11:34 PM    INDICATION: PE suspected, high prob. Fever, cough and hypoxia.  COMPARISON: None.  TECHNIQUE: CT chest pulmonary angiogram during arterial phase injection of IV contrast. Multiplanar reformats and MIP reconstructions were performed. Dose reduction techniques were used.   CONTRAST: ISOVUE 370 100ML    FINDINGS:  ANGIOGRAM CHEST: Pulmonary arteries are normal caliber and negative for pulmonary emboli. Thoracic aorta is negative for dissection. No CT evidence for right heart strain.    LUNGS AND PLEURA: Extensive peripheral groundglass pulmonary infiltrates are present bilaterally in a pattern very compatible with COVID pneumonia. No pleural effusion.    MEDIASTINUM/AXILLAE: Numerous tiny punctate calcifications with scattered small lymph nodes but no significant adenopathy.    CORONARY ARTERY CALCIFICATION: None.    UPPER ABDOMEN: Prominent fatty infiltration of liver.    MUSCULOSKELETAL: Normal.      Impression    IMPRESSION:  1.  No pulmonary emboli identified.  2.  Extensive groundglass infiltrates bilaterally consistent with COVID pneumonia.    Commonly reported imaging features of (COVID-19) pneumonia are present. Influenza pneumonia and organizing pneumonia as can be seen in the setting of drug toxicity and connective tissue disease can cause a similar imaging pattern.         Billing: This patient is  critically ill: Yes. Total critical care time today 35 min.

## 2021-11-10 NOTE — PLAN OF CARE
Problem: Adult Inpatient Plan of Care  Goal: Plan of Care Review  Outcome: No Change  Goal: Absence of Hospital-Acquired Illness or Injury  Intervention: Identify and Manage Fall Risk  Recent Flowsheet Documentation  Taken 11/10/2021 1600 by Mariela Martinez RN  Safety Promotion/Fall Prevention:   activity supervised   lighting adjusted   nonskid shoes/slippers when out of bed   fall prevention program maintained  Taken 11/10/2021 1200 by Mariela Martinez RN  Safety Promotion/Fall Prevention:   activity supervised   assistive device/personal items within reach   nonskid shoes/slippers when out of bed  Taken 11/10/2021 0800 by Mariela Martinez RN  Safety Promotion/Fall Prevention:   assistive device/personal items within reach   bed alarm on   room near nurse's station   lighting adjusted   clutter free environment maintained  Intervention: Prevent Skin Injury  Recent Flowsheet Documentation  Taken 11/10/2021 1600 by Mariela Martinez RN  Body Position: position changed independently  Taken 11/10/2021 1200 by Mariela Martinez RN  Body Position: position changed independently  Taken 11/10/2021 0800 by Mariela Martinez RN  Body Position:   position changed independently   prone  Intervention: Prevent Infection  Recent Flowsheet Documentation  Taken 11/10/2021 1600 by Mariela Martinez RN  Infection Prevention:   hand hygiene promoted   environmental surveillance performed  Taken 11/10/2021 0800 by Mariela Martinez RN  Infection Prevention:   hand hygiene promoted   environmental surveillance performed   equipment surfaces disinfected   personal protective equipment utilized   rest/sleep promoted

## 2021-11-10 NOTE — PROGRESS NOTES
ICU PROGRESS NOTE:        Assessment/Plan:     Changes for Today:      Continue to wean FIO2 as tolerated  Continue to encourage self-proning  Continue to encourage PO intake     Neuro:    Denies pain    Tylenol prn Q 6 hours, continue to monitor AST/ALT    Pulmonary:    Acute respiratory failure with hypoxia secondary to COVID-19 pneumonia    Goal SpO2 > 92%    Wean FIO2 as tolerated. At this point we are able to wean down on HFNC and have not had to use BiPAP    Cardiac:    No acute issues    GI:    Minimal appetite, nutrition to see today to discuss possible supplements appreciate    General diet    H2 receptor blocker for GI prophylaxis    Renal/:    Hypokalemia, replacement protocol    Also on Magnesium protocol as needed     ID:    Appreciate ID's assistance on case    She was considered out of the window to receive Remdesivir    She is on Baricitinib    She is on appropriate dosing of decadron      Endocrine:    No acute issues    Goal blood sugars less than 180    Heme:    Leukopenia, related to viral illness.    Thrombocytopenia, again related to above.  I have resumed her Lovenox, but at 40 mg daily versus the BID per covid dosing as her platelets have only gone up to 89.          ICU Prophylaxis:    H2 receptor blocker    Peridex:  Not required     Anticoagulation/DVT Prophylaxis:  Lovenox 40 mg daily          Lines/Drains/Tubes:  PIV X 2     CODE STATUS:  FULL      SUBJECTIVE:    Ccx:  Nauseated    HPI:    36-year-old female unvaccinated who presents with Covid symptoms since November 1.  Tested positive on the eighth.  Complains of fever and shortness of breath.  Nonproductive cough.  Increased O2 needs so she was placed on high flow nasal cannula and was admitted to the ICU on 11/9.    Past medical history significant for tobacco abuse and ADHD.      Past Medical History:   Diagnosis Date     ADHD      Allergic state      Past Surgical History:   Procedure Laterality Date     C/SECTION, LOW  TRANSVERSE  2009    Breech     D & C       LAPAROTOMY EXPLORATORY  2012    endometriosis     SURGICAL HISTORY OF -       oral surgery     Current Facility-Administered Medications   Medication     acetaminophen (TYLENOL) tablet 650 mg    Or     acetaminophen (TYLENOL) Suppository 650 mg     albuterol (PROAIR HFA/PROVENTIL HFA/VENTOLIN HFA) 108 (90 Base) MCG/ACT inhaler 2 puff     albuterol (PROAIR HFA/PROVENTIL HFA/VENTOLIN HFA) 108 (90 Base) MCG/ACT inhaler 6 puff     baricitinib (OLUMIANT) tablet 4 mg     benzocaine-menthol (CEPACOL) 15-3.6 MG lozenge 1 lozenge     benzonatate (TESSALON) capsule 100 mg     bisacodyl (DULCOLAX) Suppository 10 mg     dexamethasone (DECADRON) tablet 6 mg     [START ON 11/11/2021] enoxaparin ANTICOAGULANT (LOVENOX) injection 40 mg     famotidine (PEPCID) tablet 20 mg     guaiFENesin (ROBITUSSIN) 20 mg/mL solution 10 mL     hydrOXYzine (ATARAX) tablet 25 mg     lidocaine (LMX4) cream     lidocaine 1 % 0.1-1 mL     magnesium hydroxide (MILK OF MAGNESIA) suspension 30 mL     Medication instructions: Do NOT use nebulized medications     melatonin tablet 3 mg     nicotine (NICODERM CQ) 14 MG/24HR 24 hr patch 1 patch     nicotine Patch in Place     ondansetron (ZOFRAN-ODT) ODT tab 4 mg    Or     ondansetron (ZOFRAN) injection 4 mg     prochlorperazine (COMPAZINE) injection 10 mg    Or     prochlorperazine (COMPAZINE) tablet 10 mg    Or     prochlorperazine (COMPAZINE) suppository 25 mg     senna-docusate (SENOKOT-S/PERICOLACE) 8.6-50 MG per tablet 1 tablet     sodium chloride (PF) 0.9% PF flush 3 mL     sodium chloride (PF) 0.9% PF flush 3 mL        Allergies   Allergen Reactions     Amoxicillin Rash     Morphine Hives     Pollen Extract-Tree Extract [Pollen Extract]      Sulfa Drugs Rash     Levofloxacin Hives, Itching and Rash     rash       Family History   Problem Relation Age of Onset     Cancer Brother         osteo carcoma     Genetic Disorder Brother         cleft lip/palate      Diabetes Father      Hypertension Father      Heart Disease Paternal Grandfather      Hypertension Paternal Grandfather      Heart Disease Maternal Grandfather      Hypertension Maternal Grandfather      Heart Disease Paternal Grandmother      Hypertension Paternal Grandmother      Hypertension Mother          PHYSICAL ASSESSMENT:  General: appears stated age, alert, cooperative  Lungs:  Diminished throughout   Heart: RRR, S1 and S2   Abdomen: Soft, non-tender.  Bowel sounds present X 4 quadrants.  Skin: skin color normal, texture normal, no rashes or lesions.   Extremities:  No edema  Pulses:  +2 BUE and +2 BLE  Neuro:  Grossly intact     Temp: 99.2  F (37.3  C) Temp src: Oral BP: 131/79 Pulse: 79   Resp: 27 SpO2: 95 % O2 Device: High Flow Nasal Cannula (HFNC) Oxygen Delivery: 50 LPM        Intake/Output Summary (Last 24 hours) at 11/10/2021 1208  Last data filed at 11/10/2021 0800  Gross per 24 hour   Intake 195 ml   Output --   Net 195 ml     Temp: 99.2  F (37.3  C) Temp src: Oral BP: 131/79 Pulse: 79   Resp: 27 SpO2: 95 % O2 Device: High Flow Nasal Cannula (HFNC) Oxygen Delivery: 50 LPM      Lab Results   Component Value Date    WBC 3.0 11/10/2021    WBC 13.4 09/21/2015     Lab Results   Component Value Date    RBC 3.30 11/10/2021    RBC 3.21 09/21/2015     Lab Results   Component Value Date    HGB 12.4 11/10/2021    HGB 9.8 09/21/2015    HGB 9.8 09/21/2015     Lab Results   Component Value Date    HCT 36.9 11/10/2021    HCT 29.9 09/21/2015     No components found for: MCT  Lab Results   Component Value Date     11/10/2021    MCV 93 09/21/2015     Lab Results   Component Value Date    MCH 37.6 11/10/2021    MCH 30.5 09/21/2015     Lab Results   Component Value Date    MCHC 33.6 11/10/2021    MCHC 32.8 09/21/2015     Lab Results   Component Value Date    RDW 12.9 11/10/2021    RDW 11.9 09/21/2015     Lab Results   Component Value Date    PLT 89 11/10/2021     09/21/2015       Last Comprehensive  Metabolic Panel:  Sodium   Date Value Ref Range Status   11/10/2021 142 136 - 145 mmol/L Final   01/01/2014 132 (L) 133 - 144 mmol/L Final     Potassium   Date Value Ref Range Status   11/10/2021 3.3 (L) 3.5 - 5.0 mmol/L Final   01/01/2014 3.7 3.4 - 5.3 mmol/L Final     Chloride   Date Value Ref Range Status   11/10/2021 107 98 - 107 mmol/L Final   01/01/2014 108 94 - 109 mmol/L Final     Carbon Dioxide   Date Value Ref Range Status   01/01/2014 21 20 - 32 mmol/L Final     Carbon Dioxide (CO2)   Date Value Ref Range Status   11/10/2021 26 22 - 31 mmol/L Final     Anion Gap   Date Value Ref Range Status   11/10/2021 9 5 - 18 mmol/L Final   01/01/2014 4 (L) 6 - 17 mmol/L Final     Glucose   Date Value Ref Range Status   11/10/2021 117 70 - 125 mg/dL Final   01/01/2014 93 60 - 99 mg/dL Final     Urea Nitrogen   Date Value Ref Range Status   11/10/2021 10 8 - 22 mg/dL Final   01/01/2014 11 5 - 24 mg/dL Final     Creatinine   Date Value Ref Range Status   11/10/2021 0.53 (L) 0.60 - 1.10 mg/dL Final   01/01/2014 0.59 0.52 - 1.04 mg/dL Final     GFR Estimate   Date Value Ref Range Status   11/10/2021 >90 >60 mL/min/1.73m2 Final     Comment:     As of July 11, 2021, eGFR is calculated by the CKD-EPI creatinine equation, without race adjustment. eGFR can be influenced by muscle mass, exercise, and diet. The reported eGFR is an estimation only and is only applicable if the renal function is stable.   01/01/2014 >90 >60 mL/min/1.7m2 Final     Calcium   Date Value Ref Range Status   11/10/2021 8.5 8.5 - 10.5 mg/dL Final   01/01/2014 8.1 (L) 8.5 - 10.4 mg/dL Final       Last Arterial Blood Gas:  No results found for: PH, PCO2, PO2, HCO3, O2SAT, BASEEXCESS, NICHOLAS, SAT    No results found for: TROPI    EXAM: CT CHEST PULMONARY EMBOLISM W CONTRAST  LOCATION: [facility]  DATE/TIME: 11/8/2021 11:34 PM     INDICATION: PE suspected, high prob. Fever, cough and hypoxia.  COMPARISON: None.  TECHNIQUE: CT chest  pulmonary angiogram during arterial phase injection of IV contrast. Multiplanar reformats and MIP reconstructions were performed. Dose reduction techniques were used.   CONTRAST: ISOVUE 370 100ML     FINDINGS:  ANGIOGRAM CHEST: Pulmonary arteries are normal caliber and negative for pulmonary emboli. Thoracic aorta is negative for dissection. No CT evidence for right heart strain.     LUNGS AND PLEURA: Extensive peripheral groundglass pulmonary infiltrates are present bilaterally in a pattern very compatible with COVID pneumonia. No pleural effusion.     MEDIASTINUM/AXILLAE: Numerous tiny punctate calcifications with scattered small lymph nodes but no significant adenopathy.     CORONARY ARTERY CALCIFICATION: None.     UPPER ABDOMEN: Prominent fatty infiltration of liver.     MUSCULOSKELETAL: Normal.                                                                      IMPRESSION:  1.  No pulmonary emboli identified.  2.  Extensive groundglass infiltrates bilaterally consistent with COVID pneumonia.     Commonly reported imaging features of (COVID-19) pneumonia are present. Influenza pneumonia and organizing pneumonia as can be seen in the setting of drug toxicity and connective tissue disease can cause a similar imaging pattern.          JERMAINE Maxwell, CNP  Pulmonary Critical Care  Pager: [phone]    Time Billed:   45 minutes  of critical care time.    Patient requiring ICU level of care: Remains on high levels of FIO2 for acute respiratory failure from COVID.  High risk for further decompensation of need for intubation

## 2021-11-10 NOTE — PROGRESS NOTES
RT Note    Patient remained on HFNC throughout day 50 LPM 55-65%. Patient using an aerosol mask to HFNC and has not wanted to use the high flow cannula.     RR 18-22  BS diminished    Patient's SPO2 improved when she is proning. RT will continue to encourage self proning and wean FiO2 as able.     Daniella Ramos, RT

## 2021-11-10 NOTE — PROGRESS NOTES
Care Management Follow Up    Length of Stay (days): 1    Expected Discharge Date: 11/13/2021     Concerns to be Addressed:     COVID +, High flow O2, PO steroids and Olumiant, monitor labs and vs, self proning, ID following  Patient plan of care discussed at interdisciplinary rounds: Yes    Anticipated Discharge Disposition:  home     Anticipated Discharge Services:    Anticipated Discharge DME:      Patient/family educated on Medicare website which has current facility and service quality ratings:    Education Provided on the Discharge Plan:    Patient/Family in Agreement with the Plan:      Referrals Placed by CM/SW:    Private pay costs discussed: Not applicable    Additional Information:  CM following for medical progression and will assist with any discharge needs.        Renée Jose RN

## 2021-11-10 NOTE — PLAN OF CARE
Problem: Gas Exchange Impaired  Goal: Optimal Gas Exchange  Outcome: No Change     Daniella Ramos, RT

## 2021-11-10 NOTE — PLAN OF CARE
Problem: Adult Inpatient Plan of Care  Goal: Plan of Care Review  Outcome: Improving   Pt is alert, oriented x  4, able to make needs known, and using call light appropriately. Vital signs maintained within ordered limit with supplemental O2 via nasal cannula set to 60 % FiO2 at 50 LPM. Bellow the knee sequential compression devices in place, bilaterally. Family updated by telephone. Will continue to monitor. Qamar Collado RN

## 2021-11-10 NOTE — PROGRESS NOTES
Patient transported from ED to ICU [facility] room 355 on 15L oxymask without incidence, sats 91-93%. Once in ICU placed back on HFNC 50L/65%, sats 94%. Report given to accepting RT.     Nisa Ackerman, RT

## 2021-11-11 LAB
ALBUMIN SERPL-MCNC: 2.9 G/DL (ref 3.5–5)
ALP SERPL-CCNC: 185 U/L (ref 45–120)
ALT SERPL W P-5'-P-CCNC: 74 U/L (ref 0–45)
ANION GAP SERPL CALCULATED.3IONS-SCNC: 10 MMOL/L (ref 5–18)
AST SERPL W P-5'-P-CCNC: 208 U/L (ref 0–40)
BILIRUB DIRECT SERPL-MCNC: 0.8 MG/DL
BILIRUB SERPL-MCNC: 1.4 MG/DL (ref 0–1)
BUN SERPL-MCNC: 10 MG/DL (ref 8–22)
C REACTIVE PROTEIN LHE: 1.5 MG/DL (ref 0–0.8)
CALCIUM SERPL-MCNC: 8.5 MG/DL (ref 8.5–10.5)
CHLORIDE BLD-SCNC: 107 MMOL/L (ref 98–107)
CO2 SERPL-SCNC: 22 MMOL/L (ref 22–31)
CREAT SERPL-MCNC: 0.52 MG/DL (ref 0.6–1.1)
D DIMER PPP FEU-MCNC: 0.77 UG/ML FEU (ref 0–0.5)
ERYTHROCYTE [DISTWIDTH] IN BLOOD BY AUTOMATED COUNT: 12.8 % (ref 10–15)
FIBRINOGEN PPP-MCNC: 329 MG/DL (ref 170–490)
GFR SERPL CREATININE-BSD FRML MDRD: >90 ML/MIN/1.73M2
GLUCOSE BLD-MCNC: 107 MG/DL (ref 70–125)
HCT VFR BLD AUTO: 36 % (ref 35–47)
HGB BLD-MCNC: 12.2 G/DL (ref 11.7–15.7)
MAGNESIUM SERPL-MCNC: 1.9 MG/DL (ref 1.8–2.6)
MCH RBC QN AUTO: 37.7 PG (ref 26.5–33)
MCHC RBC AUTO-ENTMCNC: 33.9 G/DL (ref 31.5–36.5)
MCV RBC AUTO: 111 FL (ref 78–100)
PLATELET # BLD AUTO: 119 10E3/UL (ref 150–450)
POTASSIUM BLD-SCNC: 3.4 MMOL/L (ref 3.5–5)
POTASSIUM BLD-SCNC: 3.6 MMOL/L (ref 3.5–5)
PROT SERPL-MCNC: 5.9 G/DL (ref 6–8)
RBC # BLD AUTO: 3.24 10E6/UL (ref 3.8–5.2)
SODIUM SERPL-SCNC: 139 MMOL/L (ref 136–145)
WBC # BLD AUTO: 3.7 10E3/UL (ref 4–11)

## 2021-11-11 PROCEDURE — 36415 COLL VENOUS BLD VENIPUNCTURE: CPT | Performed by: INTERNAL MEDICINE

## 2021-11-11 PROCEDURE — 85379 FIBRIN DEGRADATION QUANT: CPT | Performed by: HOSPITALIST

## 2021-11-11 PROCEDURE — 83735 ASSAY OF MAGNESIUM: CPT | Performed by: INTERNAL MEDICINE

## 2021-11-11 PROCEDURE — 80053 COMPREHEN METABOLIC PANEL: CPT | Performed by: NURSE PRACTITIONER

## 2021-11-11 PROCEDURE — 250N000013 HC RX MED GY IP 250 OP 250 PS 637: Performed by: INTERNAL MEDICINE

## 2021-11-11 PROCEDURE — 99291 CRITICAL CARE FIRST HOUR: CPT | Performed by: INTERNAL MEDICINE

## 2021-11-11 PROCEDURE — 85027 COMPLETE CBC AUTOMATED: CPT | Performed by: HOSPITALIST

## 2021-11-11 PROCEDURE — 86141 C-REACTIVE PROTEIN HS: CPT | Performed by: HOSPITALIST

## 2021-11-11 PROCEDURE — 250N000012 HC RX MED GY IP 250 OP 636 PS 637: Performed by: INTERNAL MEDICINE

## 2021-11-11 PROCEDURE — 99207 PR NO CHARGE LOS: CPT | Performed by: INTERNAL MEDICINE

## 2021-11-11 PROCEDURE — 999N000215 HC STATISTIC HFNC ADULT NON-CPAP

## 2021-11-11 PROCEDURE — 85384 FIBRINOGEN ACTIVITY: CPT | Performed by: HOSPITALIST

## 2021-11-11 PROCEDURE — 84132 ASSAY OF SERUM POTASSIUM: CPT | Performed by: INTERNAL MEDICINE

## 2021-11-11 PROCEDURE — 250N000011 HC RX IP 250 OP 636: Performed by: INTERNAL MEDICINE

## 2021-11-11 PROCEDURE — 200N000001 HC R&B ICU

## 2021-11-11 PROCEDURE — 36415 COLL VENOUS BLD VENIPUNCTURE: CPT | Performed by: HOSPITALIST

## 2021-11-11 PROCEDURE — 250N000013 HC RX MED GY IP 250 OP 250 PS 637: Performed by: HOSPITALIST

## 2021-11-11 RX ORDER — IBUPROFEN 400 MG/1
400 TABLET, FILM COATED ORAL
Status: COMPLETED | OUTPATIENT
Start: 2021-11-11 | End: 2021-11-11

## 2021-11-11 RX ORDER — DEXTROSE MONOHYDRATE 25 G/50ML
25-50 INJECTION, SOLUTION INTRAVENOUS
Status: DISCONTINUED | OUTPATIENT
Start: 2021-11-11 | End: 2021-11-15 | Stop reason: HOSPADM

## 2021-11-11 RX ORDER — POTASSIUM CHLORIDE 1500 MG/1
20 TABLET, EXTENDED RELEASE ORAL ONCE
Status: COMPLETED | OUTPATIENT
Start: 2021-11-11 | End: 2021-11-11

## 2021-11-11 RX ORDER — NICOTINE POLACRILEX 4 MG
15-30 LOZENGE BUCCAL
Status: DISCONTINUED | OUTPATIENT
Start: 2021-11-11 | End: 2021-11-15 | Stop reason: HOSPADM

## 2021-11-11 RX ADMIN — BARICITINIB 4 MG: 2 TABLET, FILM COATED ORAL at 08:21

## 2021-11-11 RX ADMIN — HYDROXYZINE HYDROCHLORIDE 25 MG: 25 TABLET, FILM COATED ORAL at 05:58

## 2021-11-11 RX ADMIN — NICOTINE 1 PATCH: 14 PATCH, EXTENDED RELEASE TRANSDERMAL at 08:24

## 2021-11-11 RX ADMIN — ALBUTEROL SULFATE 2 PUFF: 90 INHALANT RESPIRATORY (INHALATION) at 05:58

## 2021-11-11 RX ADMIN — ALBUTEROL SULFATE 6 PUFF: 90 AEROSOL, METERED RESPIRATORY (INHALATION) at 19:53

## 2021-11-11 RX ADMIN — ENOXAPARIN SODIUM 27 MG: 30 INJECTION SUBCUTANEOUS at 20:14

## 2021-11-11 RX ADMIN — BENZONATATE 100 MG: 100 CAPSULE ORAL at 17:26

## 2021-11-11 RX ADMIN — POTASSIUM CHLORIDE 20 MEQ: 1500 TABLET, EXTENDED RELEASE ORAL at 05:57

## 2021-11-11 RX ADMIN — IBUPROFEN 400 MG: 400 TABLET, FILM COATED ORAL at 17:26

## 2021-11-11 RX ADMIN — ENOXAPARIN SODIUM 40 MG: 40 INJECTION SUBCUTANEOUS at 08:22

## 2021-11-11 RX ADMIN — DEXAMETHASONE 6 MG: 2 TABLET ORAL at 12:44

## 2021-11-11 RX ADMIN — FAMOTIDINE 20 MG: 20 TABLET, FILM COATED ORAL at 08:22

## 2021-11-11 RX ADMIN — ALBUTEROL SULFATE 6 PUFF: 90 AEROSOL, METERED RESPIRATORY (INHALATION) at 17:27

## 2021-11-11 RX ADMIN — HYDROXYZINE HYDROCHLORIDE 25 MG: 25 TABLET, FILM COATED ORAL at 00:11

## 2021-11-11 RX ADMIN — HYDROXYZINE HYDROCHLORIDE 25 MG: 25 TABLET, FILM COATED ORAL at 12:44

## 2021-11-11 RX ADMIN — FAMOTIDINE 20 MG: 20 TABLET, FILM COATED ORAL at 19:51

## 2021-11-11 RX ADMIN — HYDROXYZINE HYDROCHLORIDE 25 MG: 25 TABLET, FILM COATED ORAL at 19:51

## 2021-11-11 RX ADMIN — ALBUTEROL SULFATE 6 PUFF: 90 AEROSOL, METERED RESPIRATORY (INHALATION) at 08:23

## 2021-11-11 RX ADMIN — BENZONATATE 100 MG: 100 CAPSULE ORAL at 08:22

## 2021-11-11 RX ADMIN — BENZONATATE 100 MG: 100 CAPSULE ORAL at 11:35

## 2021-11-11 ASSESSMENT — ACTIVITIES OF DAILY LIVING (ADL)
ADLS_ACUITY_SCORE: 7

## 2021-11-11 NOTE — PLAN OF CARE
Problem: Gas Exchange Impaired  Goal: Optimal Gas Exchange  Outcome: Improving     Daniella Ramos RT

## 2021-11-11 NOTE — PROGRESS NOTES
Discussed with intensivist and still needs ICU based on HFNC 50 LPM 55-65%. Patient using an aerosol mask to HFNC and has not wanted to use the high flow cannula. [facility] will follow peripherally for now.

## 2021-11-11 NOTE — PROGRESS NOTES
[facility]:  CRITICAL CARE PROGRESS NOTE     Date / Time of Admission:  11/8/2021 11:20 PM    ID: Devika Velázquez is a 36 year old female, unvaccinated against COVID19, with history of tobacco abuse, ADHD, chronic low back pain who was admitted to [facility] on 11/8/2021 with COVID-19 viral pneumonia, had progressive respiratory failure requiring high-flow nasal cannula and transferred to the ICU on 11/9/2021 for continued care.         Changes for today: 1.   FiO2 needs worsening this morning, increased to 80%.  Currently on Oxymask-high flow and I explained to the patient that we have less accuracy with true O2 needs; transition to HFNC as tolerates.  2. Bedrest due to worsening O2 needs.  3. Glucerna clears for improved nutritional support - has been losing weight this admit.  4. Robitussin + Ibuprofen x 1 for anterior chest discomfort with deep inspiration.         Assessment/Plan:   Neurologic: History of ADHD, chronic low back pain.      Continue atarax PRN for anxiety.      Pulmonary: Acute respiratory failure with hypoxia 2/2 COVID-19 viral pneumonia.  Intermittent cough.  Tobacco use disorder - active smoker.     Wean supplemental O2 as tolerated; goal O2 sat > 92%.  HOB > 30 degrees to limit aspiration risk.      HFNC, 20 - 60 L/min, wean FiO2 as tolerates.     Oxymask -high flow, 20 - 60 L/min => encourage transition from Oxymask to HFNC as do not get PEEP support from the oxymask.    Continue albuterol 6 puff Q6H.  Consider taper dose tomorrow.      Continue benzonatate 100 mg 3x/day scheduled.    Nicotine patch 14 mg daily     Cardiovascular: No issues.      Cardiac monitoring.  SBP >= 90 mmHg, MAP >= 65 mmHg.  EKG PRN.     GI/: Hypoalbuminemia with severe protein-calorie malnutrition.  Patient losing weight since admission.  Abnormal LFTs.      Nutrition: Regular diet.  Addition of Glucerna Clears for additional nutrition.     Last BM:  11/10.  Meds: None..    GI  prophylaxis: h2 blocker     Renal: No issues.      Monitor I/O's.  Electrolyte repletion PRN.  Avoid/limit nephrotoxic agents.    IVFs: Saline lock     Heme/Onc: COVID-19 associated coagulopathy.  D-dimer 0.77 on 11/11.  Thrombocytopenia, improved.    Increase lovenox to standard dosing 0.5 mg/kg 2x/day given platelets, improving no evidence of bleeding, and ICU status.    DVT prophylaxis: SCDs.  Increase Lovenox to 0.5 mg/kg subcu 2X/day.     Endocrine: No issues.      FSBG PRN.  Insulin not indicated. Hypoglycemia protocol.     Infectious diseases: COVID-19 viral infection.  Patient symptomatic since 11/1.  Presented to the ED, Covid PCR positive on 11/8.  Transferred to the ICU on 11/9.  Did not qualify for remdesivir per ID given prolonged length of symptoms + abnormal LFTs.    Continue Decadron daily x10 days (started 11/8)    Continue baricitinib daily x14 days (started 11/9)    Rheum/Musculoskeletal:     Activity: Bedrest due to rising FiO2 needs.    Lines/Drains/Tubes:  PIV     Code Status:  Full Code     The patient and/or the family was educated about the above plan of care and indicated understanding.       This patient is considered critically ill and requires ICU level of care due to acute respiratory failure with hypoxia requiring high-flow nasal cannula/oxymask.     Total Critical Care time, not including separate billable procedure time: 50 minutes.    Virgen Berumen MD, MPH  Pulmonary/Critical Care Medicine  11/11/2021   2:45 PM         ICU DAILY CHECKLIST:   ICU DAILY CHECKLIST           Can patient transfer out of MICU? no    FAST HUG:    Feeding:  yes.  Patient is receiving ORAL    Parrish: no  Analgesia/Sedation: no; PRNs   Thromboembolic prophylaxis: yes; Mode:  Lovenox and SCDs  HOB>30:  yes  Stress Ulcer Protocol Active: yes; Mode: H2 Antagonist  Glycemic Control: Any glucose > 180 no; Mode of Insulin Therapy: Not indicated    INTUBATED:  Can patient have daily waking:  n/a  Can patient have  "spontaneous breathing trial:  n/a    Restraints? no    PHYSICAL THERAPY AND MOBILITY:  Can patient have PT and mobility trial: no  Activity: Bedrest    RISK FACTORS PRESENT ON ADMISSION:  Clinically Significant Risk Factors Present on Admission                     Subjective/Interval History:   11/9:  Transferred to ICU with HFNC    Overnight had issues with insomnia and anxiety.  Received atarax with improvement.  Patient reports rare cough.  Anterior chest discomfort when taking big breaths.  No hemoptysis.  Last BM yesterday.    Review of Systems:  A comprehensive review of systems was performed and found to be negative except as described in this note        Allergies/Medications:   Allergies:     Allergies   Allergen Reactions     Amoxicillin Rash     Morphine Hives     Pollen Extract-Tree Extract [Pollen Extract]      Sulfa Drugs Rash     Levofloxacin Hives, Itching and Rash     rash         Continuous Infusions:    - MEDICATION INSTRUCTIONS -       Scheduled Medications:    albuterol  6 puff Inhalation Q6H     baricitinib  4 mg Oral Daily     benzonatate  100 mg Oral TID     dexamethasone  6 mg Oral Daily     enoxaparin ANTICOAGULANT  40 mg Subcutaneous Q24H     famotidine  20 mg Oral BID     nicotine  1 patch Transdermal Daily     nicotine   Transdermal Q8H     sodium chloride (PF)  3 mL Intracatheter Q8H           Objective:   Vitals:  BP (!) 140/87   Pulse 69   Temp 98.9  F (37.2  C) (Oral)   Resp 26   Ht 1.854 m (6' 1\")   Wt 53.9 kg (118 lb 14.4 oz)   SpO2 99%   BMI 15.69 kg/m    Vent: FiO2 (%): (S) 60 %  Resp: 26    GEN: Pleasant female, lying in the bed in no acute distress.  HEENT: Normocephalic, atraumatic.  Extraoccular eye movements intact, anicteric sclera. No sinus tenderness to palpation.  Moist mucous membranes.  On Oxymask-High-flow.  NECK: Supple.    PULM: Non-labored breathing.  No use of accessory muscles.  Clear to ausculation bilaterally.   CVS: Regular rate and rhythm.  Normal S1, " S2.  No rubs, murmurs, or gallops.    ABDOMEN: Normoactive bowel sounds.  Non-tender to palpation.  Non-distended.    EXTREMITES:  No clubbing, cyanosis, or edema.    NEURO:  Awake.  Oriented to person, place, time and situation.  Cranial nerves 2-12 grossly intact.  Moving all extremities.      Intake/Output: I/O last 3 completed shifts:  In: 325 [P.O.:325]  Out: 930 [Urine:930]        Pertinent Studies:   All laboratory data reviewed  Serum Glucose range:   Recent Labs   Lab 11/11/21  0432 11/10/21  0411 11/09/21  0845 11/08/21  2145    117 136* 101     ABG: No lab results found in last 7 days.  CBC:   Recent Labs   Lab 11/11/21  0432 11/10/21  0411 11/09/21  0845 11/08/21  2145   WBC 3.7* 3.0* 1.8* 2.8*   HGB 12.2 12.4 12.9 13.6   HCT 36.0 36.9 38.4 39.9   * 112* 111* 109*   * 89* 79* 79*   NEUTROPHIL  --   --   --  87   LYMPH  --   --   --  7   MONOCYTE  --   --   --  6   EOSINOPHIL  --   --   --  0     Chemistry:   Recent Labs   Lab 11/11/21  0954 11/11/21  0432 11/10/21  0411 11/09/21  0845 11/08/21  2145   NA  --  139 142 141 139   POTASSIUM 3.6 3.4* 3.3* 3.4* 2.7*   CHLORIDE  --  107 107 107 102   CO2  --  22 26 22 27   BUN  --  10 10 4* 4*   CR  --  0.52* 0.53* 0.50* 0.54*   GFRESTIMATED  --  >90 >90 >90 >90   KOKI  --  8.5 8.5 8.3* 9.0   MAG  --  1.9 2.0 1.8 1.2*   PROTTOTAL  --  5.9*  --  6.3 6.9   ALBUMIN  --  2.9*  --  2.9* 3.1*   AST  --  208*  --  227* 302*   ALT  --  74*  --  76* 92*   ALKPHOS  --  185*  --  186* 196*   BILITOTAL  --  1.4*  --  1.6* 1.8*     Coags:  Recent Labs   Lab 11/09/21  0845 11/08/21  2302   INR  --  1.07   PTT 42*  --      Cardiac Markers:  Recent Labs   Lab 11/08/21  2145   TROPONINI 0.01        Microbiology:  COVID-19 PCR, 11/8: Positive  Influenza A/B, 11/8:  Negative.        Cardiology/Radiology:   Cardiac: All cardiac studies reviewed by me.    EKG:  Reviewed.    TTE:  None    Radiology:  All imaging studies reviewed by me.    Chest X-Ray, 11/8:   Bilateral patchy pulmonary infiltrates which can be seen with viral pneumonias. No pleural effusion. Heart size appears normal.    CT chest PE study, 11/8:   IMPRESSION: 1.  No pulmonary emboli identified.  2.  Extensive groundglass infiltrates bilaterally consistent with COVID pneumonia.  Commonly reported imaging features of (COVID-19) pneumonia are present. Influenza pneumonia and organizing pneumonia as can be seen in the setting of drug toxicity and connective tissue disease can cause a similar imaging pattern

## 2021-11-11 NOTE — PROGRESS NOTES
Chart check resting again on her side, sound asleep 02 sat 98%  RT notes reviewed  Better when she self prones    REC:  luiz  resp cares  Time    Dr Crow ID

## 2021-11-11 NOTE — PLAN OF CARE
Problem: Adult Inpatient Plan of Care  Goal: Plan of Care Review  Outcome: Improving  Goal: Absence of Hospital-Acquired Illness or Injury  Intervention: Identify and Manage Fall Risk  Recent Flowsheet Documentation  Taken 11/11/2021 1200 by Mariela Martinez RN  Safety Promotion/Fall Prevention:   activity supervised   assistive device/personal items within reach  Taken 11/11/2021 0800 by Mariela Martinez RN  Safety Promotion/Fall Prevention: activity supervised  Intervention: Prevent Skin Injury  Recent Flowsheet Documentation  Taken 11/11/2021 1200 by Mariela Martinez RN  Body Position: position changed independently  Taken 11/11/2021 0800 by Mariela Martinez RN  Body Position: position changed independently  Intervention: Prevent Infection  Recent Flowsheet Documentation  Taken 11/11/2021 1200 by Mariela Martinez RN  Infection Prevention:   hand hygiene promoted   environmental surveillance performed  Taken 11/11/2021 0800 by Mariela Martinez RN  Infection Prevention:   hand hygiene promoted   environmental surveillance performed   personal protective equipment utilized     Problem: Adult Inpatient Plan of Care  Goal: Absence of Hospital-Acquired Illness or Injury  Intervention: Prevent Skin Injury  Recent Flowsheet Documentation  Taken 11/11/2021 1200 by Mariela Martinez RN  Body Position: position changed independently  Taken 11/11/2021 0800 by Mariela Martinez RN  Body Position: position changed independently     Problem: Adult Inpatient Plan of Care  Goal: Absence of Hospital-Acquired Illness or Injury  Intervention: Prevent Infection  Recent Flowsheet Documentation  Taken 11/11/2021 1200 by Mariela Martinez RN  Infection Prevention:   hand hygiene promoted   environmental surveillance performed  Taken 11/11/2021 0800 by Mariela Martinez RN  Infection Prevention:   hand hygiene promoted   environmental surveillance performed   personal protective equipment utilized     Problem: Risk for Delirium  Goal: Optimal  Coping  Intervention: Optimize Psychosocial Adjustment to Delirium  Recent Flowsheet Documentation  Taken 11/11/2021 1200 by Mariela Martinez RN  Supportive Measures: active listening utilized  Taken 11/11/2021 0800 by Mariela Martinez RN  Supportive Measures:   active listening utilized   self-care encouraged   relaxation techniques promoted  Goal: Improved Attention and Thought Clarity  Intervention: Maximize Cognitive Function  Recent Flowsheet Documentation  Taken 11/11/2021 0800 by Mariela Martinez RN  Reorientation Measures:   calendar in view   clock in view  Goal: Improved Sleep  Intervention: Promote Sleep  Recent Flowsheet Documentation  Taken 11/11/2021 1600 by Mariela Martinez RN  Sleep/Rest Enhancement:   awakenings minimized   noise level reduced   room darkened  Taken 11/11/2021 0800 by Mariela Martinez RN  Sleep/Rest Enhancement:   noise level reduced   awakenings minimized   consistent schedule promoted   room darkened   therapeutic touch utilized     Problem: Gas Exchange Impaired  Goal: Optimal Gas Exchange  Intervention: Optimize Oxygenation and Ventilation  Recent Flowsheet Documentation  Taken 11/11/2021 1600 by Mariela Martinez RN  Head of Bed (HOB) Positioning:   HOB at 15 degrees   HOB at 45 degrees  Taken 11/11/2021 1200 by Mariela Martinez RN  Head of Bed (HOB) Positioning: HOB lowered  Taken 11/11/2021 0800 by Mariela Martinez RN  Head of Bed (HOB) Positioning: HOB at 15 degrees

## 2021-11-11 NOTE — PROGRESS NOTES
"CLINICAL NUTRITION SERVICES - ASSESSMENT NOTE     Nutrition Prescription    RECOMMENDATIONS FOR MDs/PROVIDERS TO ORDER:  Consider MVI daily d/t poor po intake.    Malnutrition Status:    Unable to determine    Recommendations already ordered by Registered Dietitian (RD):  Ensure Enlive increase to 2x/day.    Future/Additional Recommendations:  Intake.     REASON FOR ASSESSMENT  Devika Velázquez is a/an 36 year old female assessed by the dietitian for RN Consult - Pt with decreased po intake.    NUTRITION HISTORY  Unable to reach pt by phone yesterday and today though left message for pt.  Pt ordered one meal yesterday of soup.  Chart reviewed.    CURRENT NUTRITION ORDERS  Diet: Regular  Ensure enlive daily (ordered 11/10)  Intake/Tolerance: suboptimal    LABS  CRP 1.5  Elevated LFTs.  Labs reviewed    MEDICATIONS  Decadron  Medications reviewed    ANTHROPOMETRICS  Height: 185.4 cm (6' 1\")  Most Recent Weight: 53.9 kg (118 lb 14.4 oz)    BMI: Underweight BMI <18.5  Weight History:   Wt Readings from Last 5 Encounters:   11/10/21 53.9 kg (118 lb 14.4 oz)   01/13/19 59 kg (130 lb)   09/21/15 70.3 kg (155 lb)   01/01/14 63.5 kg (140 lb)   06/08/10 56.7 kg (125 lb)   138 lb 2/18/21, 135 lb 2/23/20    Dosing Weight: 53.9 kg    ASSESSED NUTRITION NEEDS  Estimated Energy Needs: 1615+ kcals/day (30+)  Justification: Increased needs  Estimated Protein Needs: 65+ grams protein/day (1.2+)  Justification: Increased needs  Estimated Fluid Needs: 1615+ mL/day (1 mL/kcal)   Justification: Maintenance    PHYSICAL FINDINGS  See malnutrition section below.  Pt in COVID isolation precautions.       MALNUTRITION:  % Weight Loss:  14% loss in 9 months or less.  % Intake:  Decreases intake x 1 week PTA and poor intake x 3 days inpatient.  Subcutaneous Fat Loss:  Unable   Muscle Loss:  Unable  Fluid Retention:  None noted per charting.    NUTRITION DIAGNOSIS  Inadequate oral intake related to acute illness as evidenced by decreased intake "      INTERVENTIONS  Implementation     Collaboration with other providers.  Encourage po intake     Goals  Patient to consume % of nutritionally adequate meals three times per day, or the equivalent with supplements/snacks.     Monitoring/Evaluation  Progress toward goals will be monitored and evaluated per protocol.

## 2021-11-11 NOTE — PLAN OF CARE
"  Problem: Adult Inpatient Plan of Care  Goal: Absence of Hospital-Acquired Illness or Injury  Intervention: Prevent Skin Injury  Recent Flowsheet Documentation  Taken 11/11/2021 0600 by Minda Jolly RN  Body Position: prone  Taken 11/11/2021 0400 by Minda Jolly RN  Body Position:   supine, head elevated   position changed independently  Taken 11/11/2021 0300 by Minda Jolly RN  Body Position: prone  Taken 11/11/2021 0200 by Minda Jolly RN  Body Position: position changed independently  Taken 11/11/2021 0000 by Minda Jolly RN  Body Position: position changed independently  Taken 11/10/2021 2200 by Minda Jolly RN  Body Position: position changed independently  Taken 11/10/2021 2000 by Minda Jolly RN  Body Position: position changed independently     Problem: Risk for Delirium  Goal: Optimal Coping  Outcome: No Change     Problem: Gas Exchange Impaired  Goal: Optimal Gas Exchange  Outcome: No Change  Intervention: Optimize Oxygenation and Ventilation  Recent Flowsheet Documentation  Taken 11/11/2021 0400 by Minda Jolly RN  Head of Bed (HOB) Positioning: HOB at 15 degrees    Pt remains on HFNC now at 80% 60L, put self proning at some points but not staying in that position long, stating she is uncomfortable. Pt stating she is anxious throughout shift and Atarax given X2- see MAR. Pt complains of feeling \"claustrophobic\" from O2. Up to bathroom multiple times throughout shift. Bed alarm on as pt was getting self up without calling for help.     Minda Jolly RN       "

## 2021-11-11 NOTE — PROGRESS NOTES
RT Note    Patient continues on HFNC throughout day. She was initially only tolerating the aerosol mask to the HFNC circuit. After some education by MD and RT patient was able to go on the cannula but only tolerated lower flows. Patient on 40LPM and able to wean FiO2 down to 50%.    RR 20's  BS diminished    Plan: RT will continue to wean HFNC as patient tolerates to keep SPO2 >92%

## 2021-11-12 LAB
ALBUMIN SERPL-MCNC: 2.9 G/DL (ref 3.5–5)
ALP SERPL-CCNC: 219 U/L (ref 45–120)
ALT SERPL W P-5'-P-CCNC: 124 U/L (ref 0–45)
ANION GAP SERPL CALCULATED.3IONS-SCNC: 11 MMOL/L (ref 5–18)
AST SERPL W P-5'-P-CCNC: 362 U/L (ref 0–40)
BILIRUB DIRECT SERPL-MCNC: 1.1 MG/DL
BILIRUB SERPL-MCNC: 1.9 MG/DL (ref 0–1)
BUN SERPL-MCNC: 9 MG/DL (ref 8–22)
C REACTIVE PROTEIN LHE: 0.7 MG/DL (ref 0–0.8)
CALCIUM SERPL-MCNC: 8.5 MG/DL (ref 8.5–10.5)
CHLORIDE BLD-SCNC: 106 MMOL/L (ref 98–107)
CO2 SERPL-SCNC: 23 MMOL/L (ref 22–31)
CREAT SERPL-MCNC: 0.54 MG/DL (ref 0.6–1.1)
D DIMER PPP FEU-MCNC: 0.82 UG/ML FEU (ref 0–0.5)
ERYTHROCYTE [DISTWIDTH] IN BLOOD BY AUTOMATED COUNT: 12.9 % (ref 10–15)
FIBRINOGEN PPP-MCNC: 327 MG/DL (ref 170–490)
GFR SERPL CREATININE-BSD FRML MDRD: >90 ML/MIN/1.73M2
GLUCOSE BLD-MCNC: 118 MG/DL (ref 70–125)
HCT VFR BLD AUTO: 37.4 % (ref 35–47)
HGB BLD-MCNC: 12.7 G/DL (ref 11.7–15.7)
MAGNESIUM SERPL-MCNC: 1.8 MG/DL (ref 1.8–2.6)
MCH RBC QN AUTO: 38 PG (ref 26.5–33)
MCHC RBC AUTO-ENTMCNC: 34 G/DL (ref 31.5–36.5)
MCV RBC AUTO: 112 FL (ref 78–100)
PLATELET # BLD AUTO: 136 10E3/UL (ref 150–450)
POTASSIUM BLD-SCNC: 4 MMOL/L (ref 3.5–5)
PROT SERPL-MCNC: 6.5 G/DL (ref 6–8)
RBC # BLD AUTO: 3.34 10E6/UL (ref 3.8–5.2)
SODIUM SERPL-SCNC: 140 MMOL/L (ref 136–145)
WBC # BLD AUTO: 5.6 10E3/UL (ref 4–11)

## 2021-11-12 PROCEDURE — 36415 COLL VENOUS BLD VENIPUNCTURE: CPT | Performed by: INTERNAL MEDICINE

## 2021-11-12 PROCEDURE — 83735 ASSAY OF MAGNESIUM: CPT | Performed by: INTERNAL MEDICINE

## 2021-11-12 PROCEDURE — 250N000013 HC RX MED GY IP 250 OP 250 PS 637: Performed by: INTERNAL MEDICINE

## 2021-11-12 PROCEDURE — 250N000011 HC RX IP 250 OP 636: Performed by: INTERNAL MEDICINE

## 2021-11-12 PROCEDURE — 99233 SBSQ HOSP IP/OBS HIGH 50: CPT | Performed by: INTERNAL MEDICINE

## 2021-11-12 PROCEDURE — 99207 PR NO CHARGE LOS: CPT | Performed by: INTERNAL MEDICINE

## 2021-11-12 PROCEDURE — 86141 C-REACTIVE PROTEIN HS: CPT | Performed by: INTERNAL MEDICINE

## 2021-11-12 PROCEDURE — 85027 COMPLETE CBC AUTOMATED: CPT | Performed by: INTERNAL MEDICINE

## 2021-11-12 PROCEDURE — 80053 COMPREHEN METABOLIC PANEL: CPT | Performed by: INTERNAL MEDICINE

## 2021-11-12 PROCEDURE — 120N000001 HC R&B MED SURG/OB

## 2021-11-12 PROCEDURE — 85384 FIBRINOGEN ACTIVITY: CPT | Performed by: INTERNAL MEDICINE

## 2021-11-12 PROCEDURE — 250N000012 HC RX MED GY IP 250 OP 636 PS 637: Performed by: INTERNAL MEDICINE

## 2021-11-12 PROCEDURE — 999N000157 HC STATISTIC RCP TIME EA 10 MIN

## 2021-11-12 PROCEDURE — 85379 FIBRIN DEGRADATION QUANT: CPT | Performed by: INTERNAL MEDICINE

## 2021-11-12 RX ORDER — ALBUTEROL SULFATE 90 UG/1
4 AEROSOL, METERED RESPIRATORY (INHALATION)
Status: DISCONTINUED | OUTPATIENT
Start: 2021-11-12 | End: 2021-11-12

## 2021-11-12 RX ORDER — ALBUTEROL SULFATE 90 UG/1
4 AEROSOL, METERED RESPIRATORY (INHALATION) 3 TIMES DAILY
Status: DISCONTINUED | OUTPATIENT
Start: 2021-11-12 | End: 2021-11-15 | Stop reason: HOSPADM

## 2021-11-12 RX ADMIN — ALBUTEROL SULFATE 6 PUFF: 90 AEROSOL, METERED RESPIRATORY (INHALATION) at 02:52

## 2021-11-12 RX ADMIN — BENZONATATE 100 MG: 100 CAPSULE ORAL at 17:50

## 2021-11-12 RX ADMIN — BENZONATATE 100 MG: 100 CAPSULE ORAL at 12:37

## 2021-11-12 RX ADMIN — BARICITINIB 4 MG: 2 TABLET, FILM COATED ORAL at 08:14

## 2021-11-12 RX ADMIN — FAMOTIDINE 20 MG: 20 TABLET, FILM COATED ORAL at 20:33

## 2021-11-12 RX ADMIN — ENOXAPARIN SODIUM 27 MG: 30 INJECTION SUBCUTANEOUS at 08:16

## 2021-11-12 RX ADMIN — DEXAMETHASONE 6 MG: 2 TABLET ORAL at 12:37

## 2021-11-12 RX ADMIN — BENZONATATE 100 MG: 100 CAPSULE ORAL at 09:12

## 2021-11-12 RX ADMIN — ENOXAPARIN SODIUM 27 MG: 30 INJECTION SUBCUTANEOUS at 21:40

## 2021-11-12 RX ADMIN — FAMOTIDINE 20 MG: 20 TABLET, FILM COATED ORAL at 08:15

## 2021-11-12 RX ADMIN — HYDROXYZINE HYDROCHLORIDE 25 MG: 25 TABLET, FILM COATED ORAL at 20:33

## 2021-11-12 RX ADMIN — GUAIFENESIN 10 ML: 100 SOLUTION ORAL at 01:01

## 2021-11-12 RX ADMIN — ALBUTEROL SULFATE 4 PUFF: 90 AEROSOL, METERED RESPIRATORY (INHALATION) at 15:00

## 2021-11-12 RX ADMIN — ALBUTEROL SULFATE 6 PUFF: 90 AEROSOL, METERED RESPIRATORY (INHALATION) at 08:14

## 2021-11-12 RX ADMIN — NICOTINE 1 PATCH: 14 PATCH, EXTENDED RELEASE TRANSDERMAL at 08:16

## 2021-11-12 ASSESSMENT — ACTIVITIES OF DAILY LIVING (ADL)
ADLS_ACUITY_SCORE: 9
ADLS_ACUITY_SCORE: 5
ADLS_ACUITY_SCORE: 9
ADLS_ACUITY_SCORE: 7
ADLS_ACUITY_SCORE: 9
ADLS_ACUITY_SCORE: 7
ADLS_ACUITY_SCORE: 5
ADLS_ACUITY_SCORE: 9
ADLS_ACUITY_SCORE: 5
ADLS_ACUITY_SCORE: 7
ADLS_ACUITY_SCORE: 9
ADLS_ACUITY_SCORE: 9
ADLS_ACUITY_SCORE: 7
ADLS_ACUITY_SCORE: 9

## 2021-11-12 ASSESSMENT — MIFFLIN-ST. JEOR: SCORE: 1430.7

## 2021-11-12 NOTE — PROGRESS NOTES
Third day in a row where Devika is sleeping on her L side with hi flow.  On luiz.     I probably have little more to add given pulm and hospitalist cares for pt.     Call me if needed    Dr Crow sign off

## 2021-11-12 NOTE — CONSULTS
DOUG CM consulted. Per pt's RN in ICU, mother called and reporting pt has history of ETOH use as reported by pt's friends.   SW spoke to patient over the phone after transfer to . Introduced self and care management role in discharge planning, resources/support. Patient reports she lives with her mother and father, not currently working. Pt has 4 children- school aged, pt not currently working. Pt states she was  several years ago and did talk to a  for resources at that time. Pt denies mental health concerns or depression. Addressed chemical dependency concerns-pt denies drug or alcohol use and states she does not need resources. Pt reports she has consented to nursing staff to keep mother updated. Declines for SW to provide update to mother.  Pt reports she has seen her primary care provider in July, SW encouraged pt to follow up with primary care following discharge from hospital.   Sw will follow patient's progression.    Janiya Pa, TRENT  11/12/2021

## 2021-11-12 NOTE — PROGRESS NOTES
Discussed with Intensivist, Plan for downgrade this afternoon, [facility] will take over cares in am.

## 2021-11-12 NOTE — PROGRESS NOTES
Care Management Follow Up    Length of Stay (days): 3    Expected Discharge Date: 11/15/21     Concerns to be Addressed:     COVID +, High flow O2, PO steroids, Liver labs elevated, ? ETOH, monitor labs and vs, self proning, poor appetite  Patient plan of care discussed at interdisciplinary rounds: Yes     Anticipated Discharge Disposition:  home     Anticipated Discharge Services:    Anticipated Discharge DME:       Patient/family educated on Medicare website which has current facility and service quality ratings:    Education Provided on the Discharge Plan:    Patient/Family in Agreement with the Plan:       Referrals Placed by CM/SW:    Private pay costs discussed: Not applicable     Additional Information:  Asked by staff to call pt. Mother as she has concerns over ETOH issues. Asked my SW partner to address these concerns and filled her in on what I was told by staff.   CM following for medical progression and will assist with any discharge needs.      Renée Jose RN

## 2021-11-12 NOTE — PLAN OF CARE
Problem: Adult Inpatient Plan of Care  Goal: Plan of Care Review  Outcome: No Change  Flowsheets (Taken 11/12/2021 2993)  Plan of Care Reviewed With: patient   Pt transferred out of ICU   Report called to receiving RN

## 2021-11-12 NOTE — PROGRESS NOTES
[facility]:  CRITICAL CARE PROGRESS NOTE     Date / Time of Admission:  11/8/2021 11:20 PM    ID: Devika Velázquez is a 36 year old female, unvaccinated against COVID19, with history of tobacco abuse, ADHD, chronic low back pain who was admitted to [facility] on 11/8/2021 with COVID-19 viral pneumonia, had progressive respiratory failure requiring high-flow nasal cannula and transferred to the ICU on 11/9/2021 for continued care.         Changes for today: 1.   Doing better with HFNC instead of the Oxymask, on 45%,  40 L/min.    2. Never took ibuprofen for her chest discomfort yesterday.  Said it's about the same, using Robitussin PRN.   3. Okay to downgrade to Med/Surg - discussed with patient.   4.  consult placed - mother requested discussion per staff.        Assessment/Plan:   Neurologic: History of ADHD, chronic low back pain.      Continue atarax PRN for anxiety.      Pulmonary: Acute respiratory failure with hypoxia 2/2 COVID-19 viral pneumonia.  Intermittent cough.  Tobacco use disorder - active smoker.     Wean supplemental O2 as tolerated; goal O2 sat > 92%.  HOB > 30 degrees to limit aspiration risk.      HFNC, 20 - 60 L/min, wean FiO2 as tolerates.     Continue self-proning as tolerates:  Patient to self-prone as tolerates, goal between 30 min - 4 hours each time, 2-4 times/day.  Patient SHOULD NOT self-prone within 60 minutes of eating.  HOLD any proning attempts if there is hemodynamic instability or active nausea/vomiting.      Reduce albuterol to 4 puff TID.   If stable overnight, recommend reducing to 2 puff BID and then off.    Continue benzonatate 100 mg 3x/day; Robitussin PRN.    Nicotine patch 14 mg daily     Cardiovascular: No issues.      Cardiac monitoring.  SBP >= 90 mmHg, MAP >= 65 mmHg.  EKG PRN.     GI/: Hypoalbuminemia with severe protein-calorie malnutrition.  Patient losing weight since admission.  Abnormal LFTs.      Nutrition: Regular  diet.  Ensure enlive b/w meals.     Last BM:  11/10.  Meds: None..  Patient declined scheduled meds - advised they are available PRN.    GI prophylaxis: h2 blocker while on steroids/HFNC.       Renal: No issues.      Monitor I/O's.  Electrolyte repletion PRN.  Avoid/limit nephrotoxic agents.    IVFs: Saline lock     Heme/Onc: COVID-19 associated coagulopathy.  D-dimer 0.77 on 11/11.  Thrombocytopenia, improved.    Increase lovenox to standard dosing 0.5 mg/kg 2x/day given platelets, improving no evidence of bleeding, and ICU status.    DVT prophylaxis: SCDs.  Lovenox to 0.5 mg/kg subcu 2X/day.     Endocrine: No issues.      FSBG PRN.  Insulin not indicated. Hypoglycemia protocol.     Infectious diseases: COVID-19 viral infection.  Patient symptomatic since 11/1.  Presented to the ED, Covid PCR positive on 11/8.  Transferred to the ICU on 11/9.  Did not qualify for remdesivir per ID given prolonged length of symptoms + abnormal LFTs.    Continue Decadron daily x10 days (started 11/8)    Continue baricitinib daily x14 days (started 11/9)    Rheum/Musculoskeletal:     Activity: Bedrest due to rising FiO2 needs.    Lines/Drains/Tubes:  PIV     Code Status:  Full Code     The patient and/or the family was educated about the above plan of care and indicated understanding.       Patient is no longer critically ill and may be downgraded from the ICU.  Downgrade orders placed myself to Med/Surg status.  Critical Care medicine will sign off.       Total Patient Care time, not including separate billable procedure time: 35 minutes.    Virgen Berumen MD, MPH  Pulmonary/Critical Care Medicine  11/12/2021   1:01 PM         ICU DAILY CHECKLIST:   ICU DAILY CHECKLIST           Can patient transfer out of MICU? yes    FAST HUG:    Feeding:  yes.  Patient is receiving ORAL    Parrish: no  Analgesia/Sedation: no; PRNs   Thromboembolic prophylaxis: yes; Mode:  Lovenox and SCDs  HOB>30:  yes  Stress Ulcer Protocol Active: yes; Mode: H2  "Antagonist  Glycemic Control: Any glucose > 180 no; Mode of Insulin Therapy: Not indicated    INTUBATED:  Can patient have daily waking:  n/a  Can patient have spontaneous breathing trial:  n/a    Restraints? no    PHYSICAL THERAPY AND MOBILITY:  Can patient have PT and mobility trial: no  Activity: Bedrest    RISK FACTORS PRESENT ON ADMISSION:  Clinically Significant Risk Factors Present on Admission                     Subjective/Interval History:   11/9:  Transferred to ICU with HFNC  11/10 - 11/11: Overnight had issues with insomnia and anxiety.  Received atarax with improvement.  11/11:  Switched from Oxymask high flow to HFNC instead.   Started meal supplements.    Patient reports chest discomfort with deep breathing is same to slightly better since yesterday.  Never took ibuprofen.  Is anxious to go home.      Review of Systems:  A comprehensive review of systems was performed and found to be negative except as described in this note        Allergies/Medications:   Allergies:     Allergies   Allergen Reactions     Amoxicillin Rash     Morphine Hives     Pollen Extract-Tree Extract [Pollen Extract]      Sulfa Drugs Rash     Levofloxacin Hives, Itching and Rash     rash         Continuous Infusions:    - MEDICATION INSTRUCTIONS -       Scheduled Medications:    albuterol  6 puff Inhalation Q6H     baricitinib  4 mg Oral Daily     benzonatate  100 mg Oral TID     dexamethasone  6 mg Oral Daily     enoxaparin ANTICOAGULANT  0.5 mg/kg Subcutaneous BID     famotidine  20 mg Oral BID     nicotine  1 patch Transdermal Daily     nicotine   Transdermal Q8H     sodium chloride (PF)  3 mL Intracatheter Q8H           Objective:   Vitals:  BP (!) 163/92   Pulse 55   Temp 99.1  F (37.3  C) (Oral)   Resp (!) 34   Ht 1.854 m (6' 1\")   Wt 60.8 kg (134 lb)   SpO2 96%   BMI 17.68 kg/m    Vent: FiO2 (%): 50 %  Resp: (!) 34    GEN: Pleasant female, lying in the bed in no acute distress.  Sleeping, easily arousable.  HEENT: " Normocephalic, atraumatic.  Extraoccular eye movements intact, anicteric sclera. No sinus tenderness to palpation.  Moist mucous membranes.  On HFNC.   NECK: Supple.    PULM: Non-labored breathing.  No use of accessory muscles.  Clear to ausculation bilaterally.   CVS: Regular rate and rhythm.  Normal S1, S2.  No rubs, murmurs, or gallops.    ABDOMEN: Normoactive bowel sounds.  Non-tender to palpation.  Non-distended.    EXTREMITES:  No clubbing, cyanosis, or edema.    NEURO:  Awake.  Oriented to person, place, time and situation.  Cranial nerves 2-12 grossly intact.  Moving all extremities.      Intake/Output: I/O last 3 completed shifts:  In: 990 [P.O.:990]  Out: 1200 [Urine:1200]        Pertinent Studies:   All laboratory data reviewed  Serum Glucose range:   Recent Labs   Lab 11/12/21  0402 11/11/21  0432 11/10/21  0411 11/09/21  0845    107 117 136*     ABG: No lab results found in last 7 days.  CBC:   Recent Labs   Lab 11/12/21  0402 11/11/21  0432 11/10/21  0411 11/09/21  0845 11/08/21  2145   WBC 5.6 3.7* 3.0*   < > 2.8*   HGB 12.7 12.2 12.4   < > 13.6   HCT 37.4 36.0 36.9   < > 39.9   * 111* 112*   < > 109*   * 119* 89*   < > 79*   NEUTROPHIL  --   --   --   --  87   LYMPH  --   --   --   --  7   MONOCYTE  --   --   --   --  6   EOSINOPHIL  --   --   --   --  0    < > = values in this interval not displayed.     Chemistry:   Recent Labs   Lab 11/12/21 0402 11/11/21  0954 11/11/21  0432 11/10/21  0411 11/09/21  0845     --  139 142 141   POTASSIUM 4.0 3.6 3.4* 3.3* 3.4*   CHLORIDE 106  --  107 107 107   CO2 23  --  22 26 22   BUN 9  --  10 10 4*   CR 0.54*  --  0.52* 0.53* 0.50*   GFRESTIMATED >90  --  >90 >90 >90   KOKI 8.5  --  8.5 8.5 8.3*   MAG 1.8  --  1.9 2.0 1.8   PROTTOTAL 6.5  --  5.9*  --  6.3   ALBUMIN 2.9*  --  2.9*  --  2.9*   *  --  208*  --  227*   *  --  74*  --  76*   ALKPHOS 219*  --  185*  --  186*   BILITOTAL 1.9*  --  1.4*  --  1.6*      Coags:  Recent Labs   Lab 11/09/21  0845 11/08/21  2302   INR  --  1.07   PTT 42*  --      Cardiac Markers:  Recent Labs   Lab 11/08/21  2145   TROPONINI 0.01        Microbiology:  COVID-19 PCR, 11/8: Positive  Influenza A/B, 11/8:  Negative.        Cardiology/Radiology:   Cardiac: All cardiac studies reviewed by me.    EKG:  Reviewed.    TTE:  None    Radiology:  All imaging studies reviewed by me.    Chest X-Ray, 11/8:  Bilateral patchy pulmonary infiltrates which can be seen with viral pneumonias. No pleural effusion. Heart size appears normal.    CT chest PE study, 11/8:   IMPRESSION: 1.  No pulmonary emboli identified.  2.  Extensive groundglass infiltrates bilaterally consistent with COVID pneumonia.  Commonly reported imaging features of (COVID-19) pneumonia are present. Influenza pneumonia and organizing pneumonia as can be seen in the setting of drug toxicity and connective tissue disease can cause a similar imaging pattern

## 2021-11-12 NOTE — PLAN OF CARE
Problem: Adult Inpatient Plan of Care  Goal: Optimal Comfort and Wellbeing  Outcome: No Change     Problem: Risk for Delirium  Goal: Improved Sleep  Outcome: No Change  Intervention: Promote Sleep  Recent Flowsheet Documentation  Taken 11/12/2021 0400 by Cherise Ochoa RN  Sleep/Rest Enhancement:   awakenings minimized   noise level reduced   regular sleep/rest pattern promoted   relaxation techniques promoted   room darkened  Taken 11/12/2021 0000 by Cherise Ochoa RN  Sleep/Rest Enhancement:   awakenings minimized   noise level reduced   regular sleep/rest pattern promoted   relaxation techniques promoted   room darkened  Taken 11/11/2021 2000 by Cherise Ochoa RN  Sleep/Rest Enhancement:   awakenings minimized   noise level reduced   regular sleep/rest pattern promoted   relaxation techniques promoted   room darkened     Problem: Gas Exchange Impaired  Goal: Optimal Gas Exchange  Outcome: No Change  Intervention: Optimize Oxygenation and Ventilation  Recent Flowsheet Documentation  Taken 11/12/2021 0400 by Cherise Ochoa RN  Head of Bed (HOB) Positioning: HOB at 30 degrees  Taken 11/12/2021 0000 by Cherise Ochoa RN  Head of Bed (HOB) Positioning: HOB at 30 degrees  Taken 11/11/2021 2000 by Cherise Ochoa RN  Head of Bed (HOB) Positioning: HOB at 30-45 degrees     Patient alert and oriented x4. Able to make needs known. Productive cough at times. Up to BSC with assistance. Voiding without difficulty. Patient complained of anxiety t start of shift, medicated per PRN orders. 02 sats on HFNC  Mid to low 90's, patient on 40L, 50% Fi02.

## 2021-11-12 NOTE — PROGRESS NOTES
RT PROGRESS NOTE  CURRENT HIGH FLOW SETTINGS:  LITER FLOW: 40 LPM        FIO2:   45%  PATIENT PARAMETERS:  SAT: 98%  HR: 81  RR: 26  BS: Coarse    NOTE / SHIFT SUMMARY:   Patient remains on High Flow NC, she transferred to room 129.      Scarlett Lord, RT

## 2021-11-13 PROBLEM — R63.6 UNDERWEIGHT: Status: ACTIVE | Noted: 2021-11-13

## 2021-11-13 LAB
HOLD SPECIMEN: NORMAL
MAGNESIUM SERPL-MCNC: 1.8 MG/DL (ref 1.8–2.6)
POTASSIUM BLD-SCNC: 4.3 MMOL/L (ref 3.5–5)

## 2021-11-13 PROCEDURE — 94799 UNLISTED PULMONARY SVC/PX: CPT

## 2021-11-13 PROCEDURE — 250N000013 HC RX MED GY IP 250 OP 250 PS 637: Performed by: INTERNAL MEDICINE

## 2021-11-13 PROCEDURE — 99207 PR CDG-CORRECTLY CODED, REVIEWED AND AGREE: CPT | Performed by: INTERNAL MEDICINE

## 2021-11-13 PROCEDURE — 250N000011 HC RX IP 250 OP 636: Performed by: INTERNAL MEDICINE

## 2021-11-13 PROCEDURE — 250N000012 HC RX MED GY IP 250 OP 636 PS 637: Performed by: INTERNAL MEDICINE

## 2021-11-13 PROCEDURE — 84132 ASSAY OF SERUM POTASSIUM: CPT | Performed by: INTERNAL MEDICINE

## 2021-11-13 PROCEDURE — 120N000001 HC R&B MED SURG/OB

## 2021-11-13 PROCEDURE — 83735 ASSAY OF MAGNESIUM: CPT | Performed by: INTERNAL MEDICINE

## 2021-11-13 PROCEDURE — 272N000064 HC CIRCUIT HUMIDITY W/CPAP BIPAP

## 2021-11-13 PROCEDURE — 999N000157 HC STATISTIC RCP TIME EA 10 MIN

## 2021-11-13 PROCEDURE — 99232 SBSQ HOSP IP/OBS MODERATE 35: CPT | Performed by: INTERNAL MEDICINE

## 2021-11-13 PROCEDURE — 36415 COLL VENOUS BLD VENIPUNCTURE: CPT | Performed by: INTERNAL MEDICINE

## 2021-11-13 RX ADMIN — BENZONATATE 100 MG: 100 CAPSULE ORAL at 17:23

## 2021-11-13 RX ADMIN — ENOXAPARIN SODIUM 27 MG: 30 INJECTION SUBCUTANEOUS at 08:48

## 2021-11-13 RX ADMIN — ENOXAPARIN SODIUM 27 MG: 30 INJECTION SUBCUTANEOUS at 20:58

## 2021-11-13 RX ADMIN — BARICITINIB 4 MG: 2 TABLET, FILM COATED ORAL at 08:47

## 2021-11-13 RX ADMIN — DEXAMETHASONE 6 MG: 2 TABLET ORAL at 13:50

## 2021-11-13 RX ADMIN — BENZONATATE 100 MG: 100 CAPSULE ORAL at 13:51

## 2021-11-13 RX ADMIN — FAMOTIDINE 20 MG: 20 TABLET, FILM COATED ORAL at 08:47

## 2021-11-13 RX ADMIN — NICOTINE 1 PATCH: 14 PATCH, EXTENDED RELEASE TRANSDERMAL at 08:47

## 2021-11-13 RX ADMIN — BENZONATATE 100 MG: 100 CAPSULE ORAL at 08:47

## 2021-11-13 RX ADMIN — FAMOTIDINE 20 MG: 20 TABLET, FILM COATED ORAL at 20:58

## 2021-11-13 RX ADMIN — HYDROXYZINE HYDROCHLORIDE 25 MG: 25 TABLET, FILM COATED ORAL at 21:06

## 2021-11-13 RX ADMIN — HYDROXYZINE HYDROCHLORIDE 25 MG: 25 TABLET, FILM COATED ORAL at 02:30

## 2021-11-13 ASSESSMENT — ACTIVITIES OF DAILY LIVING (ADL)
ADLS_ACUITY_SCORE: 5

## 2021-11-13 ASSESSMENT — MIFFLIN-ST. JEOR: SCORE: 1433.88

## 2021-11-13 NOTE — PROGRESS NOTES
Pt currently on 45% 40 lpm HFNC, bs coarse, strong congested productive cough. Spo2 decreases while coughing. Cont monitoring.

## 2021-11-13 NOTE — PLAN OF CARE
"  Problem: Gas Exchange Impaired  Goal: Optimal Gas Exchange  Outcome: Improving   Pt on HFNC 45% at 40 L.     Problem: Pain Chronic (Persistent)  Goal: Acceptable Pain Control and Functional Ability  Outcome: Improving   Denies pain.    Independent in room up to commode. PRN atarax given x1. Ate 100%. LS coarse and diminished. Albuterol held. Pt felt like she did not need it at the time and pt reported she \"coughed up some blood\" after her last use, per RT to hold this dose.   "

## 2021-11-13 NOTE — PROGRESS NOTES
[facility]    PROGRESS NOTE - Hospitalist Service    Assessment and Plan    Principal Problem:    Pneumonia due to 2019 novel coronavirus  Active Problems:    Smoker    Hypoxemia    Hypokalemia    Hypomagnesemia    Acute respiratory failure with hypoxia (H)    LFT elevation    Underweight    Devika Velázquez is a 36 year old old female with h/o SOB     # Confirmed COVID-19 infection    # Acute Hypoxic Respiratory Failure secondary to COVID-19 infection  # Viral Pneumonia secondary to COVID-19 infection     Symptom Onset November 1, 2021   Date of 1st Positive Test November 8, 2021   Vaccination Status Not Vaccinated, but interested/contemplative         - was admitted to ICU due to increased O2 needs on HFNC, downgraded on 11/12/2021  - decreasing O2 need  - continue Dexamethasone started on 11/8 and barcitinib on 11/9 (when she started to need increased O2)   - Breathing treatments: albuterol inhaler four times a day scheduled and PRN; avoid nebulizers in favor of MDIs   - Flutter valve added today and already using IS  - self-proning as tolerates:  Patient to self-prone as tolerates, goal between 30 min - 4 hours each time, 2-4 times/day.  Patient SHOULD NOT self-prone within 60 minutes of eating.  HOLD any proning attempts if there is hemodynamic instability or active nausea/vomiting.    - IV fluids: not indicated at this time  - Antibiotics: not indicated   - DVT Prophylaxis: at high risk of thrombotic complications due to COVID-19 (DDimer = 0.78 ug/mL FEU (Ref range: 0.00 - 0.50 ug/mL FEU) ).          - Per Dr. Berumen's note since ICU status increased to 0.5mg/kg 2x/day        - having pharmacy run DOAC price in preparation  for discharge when ready   - increase activity     Decreased weight   - RD following  - addition of Supplements    #Hypokalemia, hypomagnesemia  - potassium replacement protocol  - telemetry  - mag stable      #Abnormal LFTs  Likely related to viral illness  Hold  further doses of remdesivir and ID agreed   CT scan showed prominent fatty infiltration of liver  Monitor LFTs     #Thrombocytopenia  Likely related to viral illness  Monitor while on Lovenox DVT prophylaxis has been stable      #Tobacco abuse  Cessation education     #ADHD  On Adderall PTA     COVID-19 PCR Results    COVID-19 PCR Results 11/8/21   SARS CoV2 PCR Positive (A)   (A) Abnormal value       Comments are available for some flowsheets but are not being displayed.         COVID-19 Antibody Results, Testing for Immunity    COVID-19 Antibody Results, Testing for Immunity   No data to display.            VTE prophylaxis:  Enoxaparin (Lovenox) SQ  DIET: Orders Placed This Encounter      Combination Diet Regular Diet Adult    Drains/Lines: None  Weight bearing status: WBAT  Disposition/Barriers to discharge: pending decreased O2 need   Code Status: Full Code    Subjective:  Did not sleep well last night HF was not working properly and her Oximeter was going off, no fixed. Patient states she will update her mother     PHYSICAL EXAM  Temp:  [98.2  F (36.8  C)-99.7  F (37.6  C)] 98.8  F (37.1  C)  Pulse:  [67-87] 70  Resp:  [18-31] 18  BP: (124-142)/(77-89) 142/89  FiO2 (%):  [45 %] 45 %  SpO2:  [94 %-99 %] 95 %  Wt Readings from Last 1 Encounters:   11/12/21 60.8 kg (134 lb)       Intake/Output Summary (Last 24 hours) at 11/13/2021 0813  Last data filed at 11/12/2021 2000  Gross per 24 hour   Intake 560 ml   Output 250 ml   Net 310 ml      Body mass index is 17.68 kg/m .    Physical Exam  Vitals reviewed.   Constitutional:       General: She is not in acute distress.     Appearance: She is ill-appearing. She is not toxic-appearing.   HENT:      Head: Normocephalic and atraumatic.   Eyes:      Extraocular Movements: Extraocular movements intact.      Conjunctiva/sclera: Conjunctivae normal.   Cardiovascular:      Rate and Rhythm: Normal rate and regular rhythm.      Pulses: Normal pulses.      Heart sounds: Normal  heart sounds.   Pulmonary:      Effort: Pulmonary effort is normal.      Breath sounds: Normal breath sounds.      Comments: Nonlabored Breathing  Able to complete full sentences     Abdominal:      General: Bowel sounds are normal.      Palpations: Abdomen is soft.   Musculoskeletal:         General: Normal range of motion.      Right lower leg: No edema.      Left lower leg: No edema.   Skin:     General: Skin is warm and dry.      Capillary Refill: Capillary refill takes less than 2 seconds.   Neurological:      General: No focal deficit present.      Mental Status: She is alert and oriented to person, place, and time. Mental status is at baseline.       PERTINENT LABS/IMAGING:  Results for orders placed or performed during the hospital encounter of 11/08/21   Chest XR,  PA & LAT    Impression    IMPRESSION: Bilateral patchy pulmonary infiltrates which can be seen with viral pneumonias. No pleural effusion. Heart size appears normal.   CT Chest Pulmonary Embolism w Contrast    Impression    IMPRESSION:  1.  No pulmonary emboli identified.  2.  Extensive groundglass infiltrates bilaterally consistent with COVID pneumonia.    Commonly reported imaging features of (COVID-19) pneumonia are present. Influenza pneumonia and organizing pneumonia as can be seen in the setting of drug toxicity and connective tissue disease can cause a similar imaging pattern.     Most Recent 3 CBC's:Recent Labs   Lab Test 11/12/21  0402 11/11/21  0432 11/10/21  0411   WBC 5.6 3.7* 3.0*   HGB 12.7 12.2 12.4   * 111* 112*   * 119* 89*     Most Recent 3 BMP's:Recent Labs   Lab Test 11/13/21  0819 11/12/21  0402 11/11/21  0954 11/11/21  0432 11/10/21  0411   NA  --  140  --  139 142   POTASSIUM 4.3 4.0 3.6 3.4* 3.3*   CHLORIDE  --  106  --  107 107   CO2  --  23  --  22 26   BUN  --  9  --  10 10   CR  --  0.54*  --  0.52* 0.53*   ANIONGAP  --  11  --  10 9   KOKI  --  8.5  --  8.5 8.5   GLC  --  118  --  107 117     Most Recent  2 LFT's:Recent Labs   Lab Test 11/12/21 0402 11/11/21 0432   * 208*   * 74*   ALKPHOS 219* 185*   BILITOTAL 1.9* 1.4*     Most Recent 3 INR's:Recent Labs   Lab Test 11/08/21  2302 09/21/15  0125   INR 1.07 1.00     Most Recent 3 BNP's:No lab results found.  Most Recent D-dimer:Recent Labs   Lab Test 11/12/21 0402   DD 0.82*       No results for input(s): CHOL, HDL, LDL, TRIG, CHOLHDLRATIO in the last 54882 hours.  No results for input(s): LDL in the last 67866 hours.  Recent Labs   Lab Test 11/12/21 0402      POTASSIUM 4.0   CHLORIDE 106   CO2 23      BUN 9   CR 0.54*   GFRESTIMATED >90   KOKI 8.5     No results for input(s): A1C in the last 56884 hours.  Recent Labs   Lab Test 11/12/21  0402 11/11/21  0432 11/10/21  0411   HGB 12.7 12.2 12.4     Recent Labs   Lab Test 11/08/21  2145   TROPONINI 0.01     No results for input(s): BNP, NTBNPI, NTBNP in the last 78224 hours.  No results for input(s): TSH in the last 38058 hours.  Recent Labs   Lab Test 11/08/21  2302 09/21/15  0125   INR 1.07 1.00       Radha Almendarez MD  [facility] Medicine Service  [phone]

## 2021-11-13 NOTE — PROGRESS NOTES
RESPIRATORY CARE NOTE    Pt currently on 30 lpm 35% letty well, spo2 decreases to 88-90% with coughing spells. Pt has strong congested productive cough. Flutter valve given, demonstrate understanding and uses IS and flutter valve independently. Cont titration of O2 and flow as able.

## 2021-11-13 NOTE — PLAN OF CARE
"  Problem: Gas Exchange Impaired  Goal: Optimal Gas Exchange  Intervention: Optimize Oxygenation and Ventilation  Recent Flowsheet Documentation  Taken 11/13/2021 1059 by Joce Handy RN  Head of Bed (HOB) Positioning: HOB flat   Still on high flow nasal cannula with 45% fio2 and 40 litter of oxygen. Sat is in the range of high 90's. Refused albuteral inhaler \" makes my chest hurt and cough more\". Uses IS x 1 with 2000 ml blowing. Spend most of shift is sleeping   "

## 2021-11-13 NOTE — PLAN OF CARE
Problem: Risk for Delirium  Goal: Improved Sleep  Outcome: No Change     Problem: Gas Exchange Impaired  Goal: Optimal Gas Exchange  Outcome: No Change   Patient had a hard time sleeping with hfnc on. RT informed. Independent in room, commode at bedside. Call light within reach.

## 2021-11-13 NOTE — PROGRESS NOTES
RT PROGRESS NOTE  CURRENT HIGH FLOW SETTINGS:  LITER FLOW: 40 LPM        FIO2:   45%    PATIENT PARAMETERS:  SAT: 95%  HR: 70  RR: 18  BS: diminished        NOTE / SHIFT SUMMARY:   Uneventful night from resp care stand point. Remains on the above parameters. Awake, alert, and oriented. Currently watching a movie; normal respirations--see above VS.      Lopez Jameson, RRT

## 2021-11-14 LAB
ANION GAP SERPL CALCULATED.3IONS-SCNC: 9 MMOL/L (ref 5–18)
BUN SERPL-MCNC: 9 MG/DL (ref 8–22)
CALCIUM SERPL-MCNC: 9 MG/DL (ref 8.5–10.5)
CHLORIDE BLD-SCNC: 105 MMOL/L (ref 98–107)
CO2 SERPL-SCNC: 24 MMOL/L (ref 22–31)
CREAT SERPL-MCNC: 0.52 MG/DL (ref 0.6–1.1)
ERYTHROCYTE [DISTWIDTH] IN BLOOD BY AUTOMATED COUNT: 12.7 % (ref 10–15)
GFR SERPL CREATININE-BSD FRML MDRD: >90 ML/MIN/1.73M2
GLUCOSE BLD-MCNC: 101 MG/DL (ref 70–125)
HCT VFR BLD AUTO: 39.7 % (ref 35–47)
HGB BLD-MCNC: 13.5 G/DL (ref 11.7–15.7)
MAGNESIUM SERPL-MCNC: 1.9 MG/DL (ref 1.8–2.6)
MCH RBC QN AUTO: 37.4 PG (ref 26.5–33)
MCHC RBC AUTO-ENTMCNC: 34 G/DL (ref 31.5–36.5)
MCV RBC AUTO: 110 FL (ref 78–100)
PLATELET # BLD AUTO: 247 10E3/UL (ref 150–450)
POTASSIUM BLD-SCNC: 3.6 MMOL/L (ref 3.5–5)
RBC # BLD AUTO: 3.61 10E6/UL (ref 3.8–5.2)
SODIUM SERPL-SCNC: 138 MMOL/L (ref 136–145)
WBC # BLD AUTO: 10.8 10E3/UL (ref 4–11)

## 2021-11-14 PROCEDURE — 80048 BASIC METABOLIC PNL TOTAL CA: CPT | Performed by: INTERNAL MEDICINE

## 2021-11-14 PROCEDURE — 120N000001 HC R&B MED SURG/OB

## 2021-11-14 PROCEDURE — 999N000157 HC STATISTIC RCP TIME EA 10 MIN

## 2021-11-14 PROCEDURE — 36415 COLL VENOUS BLD VENIPUNCTURE: CPT | Performed by: INTERNAL MEDICINE

## 2021-11-14 PROCEDURE — 250N000013 HC RX MED GY IP 250 OP 250 PS 637: Performed by: INTERNAL MEDICINE

## 2021-11-14 PROCEDURE — 250N000011 HC RX IP 250 OP 636: Performed by: HOSPITALIST

## 2021-11-14 PROCEDURE — 83735 ASSAY OF MAGNESIUM: CPT | Performed by: INTERNAL MEDICINE

## 2021-11-14 PROCEDURE — 250N000011 HC RX IP 250 OP 636: Performed by: INTERNAL MEDICINE

## 2021-11-14 PROCEDURE — 99207 PR CDG-DOWN CODE ROS EXAM: CPT | Performed by: HOSPITALIST

## 2021-11-14 PROCEDURE — 99232 SBSQ HOSP IP/OBS MODERATE 35: CPT | Performed by: HOSPITALIST

## 2021-11-14 PROCEDURE — 999N000215 HC STATISTIC HFNC ADULT NON-CPAP

## 2021-11-14 PROCEDURE — 85014 HEMATOCRIT: CPT | Performed by: INTERNAL MEDICINE

## 2021-11-14 PROCEDURE — 250N000012 HC RX MED GY IP 250 OP 636 PS 637: Performed by: INTERNAL MEDICINE

## 2021-11-14 RX ADMIN — BENZONATATE 100 MG: 100 CAPSULE ORAL at 17:01

## 2021-11-14 RX ADMIN — FAMOTIDINE 20 MG: 20 TABLET, FILM COATED ORAL at 09:05

## 2021-11-14 RX ADMIN — DEXAMETHASONE 6 MG: 2 TABLET ORAL at 12:26

## 2021-11-14 RX ADMIN — BARICITINIB 4 MG: 2 TABLET, FILM COATED ORAL at 09:05

## 2021-11-14 RX ADMIN — BENZONATATE 100 MG: 100 CAPSULE ORAL at 12:26

## 2021-11-14 RX ADMIN — NICOTINE 1 PATCH: 14 PATCH, EXTENDED RELEASE TRANSDERMAL at 09:02

## 2021-11-14 RX ADMIN — HYDROXYZINE HYDROCHLORIDE 25 MG: 25 TABLET, FILM COATED ORAL at 17:01

## 2021-11-14 RX ADMIN — ENOXAPARIN SODIUM 27 MG: 30 INJECTION SUBCUTANEOUS at 09:05

## 2021-11-14 RX ADMIN — FAMOTIDINE 20 MG: 20 TABLET, FILM COATED ORAL at 20:44

## 2021-11-14 RX ADMIN — ALBUTEROL SULFATE 4 PUFF: 90 AEROSOL, METERED RESPIRATORY (INHALATION) at 20:44

## 2021-11-14 RX ADMIN — BENZONATATE 100 MG: 100 CAPSULE ORAL at 09:05

## 2021-11-14 RX ADMIN — ENOXAPARIN SODIUM 30 MG: 30 INJECTION SUBCUTANEOUS at 20:44

## 2021-11-14 ASSESSMENT — ACTIVITIES OF DAILY LIVING (ADL)
ADLS_ACUITY_SCORE: 5

## 2021-11-14 NOTE — PROGRESS NOTES
[facility]    Medicine Progress Note - Hospitalist Service       Date of Admission:  11/8/2021    Assessment & Plan           Devika Velázquez is a 36 year old female admitted on 11/8/2021. She has history of tobacco abuse and ADHD, presented for dyspnea found to have COVID-19    # Confirmed COVID-19 infection    # Acute Hypoxic Respiratory Failure secondary to COVID-19 infection  # Viral Pneumonia secondary to COVID-19 infection     Symptom Onset November 1, 2021   Date of 1st Positive Test November 8, 2021   Vaccination Status Not Vaccinated, but interested/contemplative         - was admitted to ICU due to increased O2 needs on HFNC, downgraded on 11/12/2021  - decreasing O2 need, down to 7L oxymask today  - continue Dexamethasone started on 11/8 and baracitinib on 11/9 (when she started to need increased O2). Remdesivir not thought to be helpful at this point, and she had transaminitis, got 1 dose, not continued.  - Breathing treatments: albuterol inhaler four times a day scheduled and PRN; avoid nebulizers in favor of MDIs   - Flutter valve and IS  - self-proning as tolerates:  Patient to self-prone as tolerates, goal between 30 min - 4 hours each time, 2-4 times/day.  Patient SHOULD NOT self-prone within 60 minutes of eating.  HOLD any proning attempts if there is hemodynamic instability or active nausea/vomiting.    - DVT Prophylaxis: at high risk of thrombotic complications due to COVID-19 (DDimer = 0.82).          - Per Dr. Berumen's note since ICU status lovenox increased to 0.5mg/kg 2x/day        - having pharmacy run DOAC price in preparation  for discharge when ready- reportedly no cost to patient with Merged with [facility] discharge offer that our pharmacy can provide  - increase activity      #Decreased weight   - BMI 17, decreased muscle mass  - RD following  - addition of Supplements     #Hypokalemia  - potassium replacement protocol  - telemetry  - mag stable after  replacement     #Abnormal LFTs  Likely related to viral illness  Hold further doses of remdesivir and ID agreed   CT scan showed prominent fatty infiltration of liver, concerning given young age and would recommend lifestyle modification  Monitor LFTs     #Thrombocytopenia  Likely related to viral illness  Monitor while on Lovenox DVT prophylaxis, has now resolved     #Tobacco abuse  Cessation education     #ADHD  On Adderall PTA       Diet: Combination Diet Regular Diet Adult  Snacks/Supplements Adult: Ensure Enlive; Between Meals    Parrish Catheter: Not present  Central Lines: None  Code Status: Full Code      Disposition Plan   Disposition: Home in 1-2 days possibly on home O2  Discharge barriers: hypoxia  Medically ready to discharge today: No  Estimated discharge date: 11/16/2021     The patient's care was discussed with the Patient.    Ml Paz MD  Hospitalist Service  [facility]  Text page via [employer] Paging/Directory      Clinically Significant Risk Factors Present on Admission                ____________        Physical Exam   Vital Signs: Temp: 98.5  F (36.9  C) Temp src: Oral BP: 125/80 Pulse: 83   Resp: 20 SpO2: 95 % O2 Device: Oxymask Oxygen Delivery: 7 LPM  Weight: 134 lbs 11.2 oz  General: in no apparent distress, non-toxic and alert female lying in hospital bed oriented x3  HEENT: Head normocephalic atraumatic, oral mucosa moist. Sclerae anicteric  CV: Regular rhythm, normal rate, no murmurs  Resp: No wheezes, no rales or rhonchi, no focal consolidations  GI: Belly soft, nondistended, nontender, bowel sounds present  Skin: No rashes or lesions  Extremities: No peripheral edema. Very thin extremities  Psych: Normal affect, mood euthymic, slightly irritable  Neuro: CNII-XII grossly intact, moving all 4 extremities      Data   Recent Results (from the past 24 hour(s))   CBC with platelets    Collection Time: 11/14/21  7:26 AM   Result Value Ref Range    WBC Count 10.8 4.0 -  11.0 10e3/uL    RBC Count 3.61 (L) 3.80 - 5.20 10e6/uL    Hemoglobin 13.5 11.7 - 15.7 g/dL    Hematocrit 39.7 35.0 - 47.0 %     (H) 78 - 100 fL    MCH 37.4 (H) 26.5 - 33.0 pg    MCHC 34.0 31.5 - 36.5 g/dL    RDW 12.7 10.0 - 15.0 %    Platelet Count 247 150 - 450 10e3/uL   Basic metabolic panel    Collection Time: 11/14/21  7:26 AM   Result Value Ref Range    Sodium 138 136 - 145 mmol/L    Potassium 3.6 3.5 - 5.0 mmol/L    Chloride 105 98 - 107 mmol/L    Carbon Dioxide (CO2) 24 22 - 31 mmol/L    Anion Gap 9 5 - 18 mmol/L    Urea Nitrogen 9 8 - 22 mg/dL    Creatinine 0.52 (L) 0.60 - 1.10 mg/dL    Calcium 9.0 8.5 - 10.5 mg/dL    Glucose 101 70 - 125 mg/dL    GFR Estimate >90 >60 mL/min/1.73m2   Magnesium    Collection Time: 11/14/21  7:26 AM   Result Value Ref Range    Magnesium 1.9 1.8 - 2.6 mg/dL     ____________  Interval History   Data reviewed today: I reviewed all medications, new labs and imaging results over the last 24 hours. I personally reviewed no images or EKG's today.  patient states doing ok. feeling better bit by bit. less winded with exertion. has not been out of bed during the day, encouraged her to sit in the chair. appetite ok. voiding and stooling ok. wondering when she can transition to NCO2, told her I defer to RT but hopefully soon. not ready for discharge.

## 2021-11-14 NOTE — PLAN OF CARE
Independent in the room. Happy to be on oxymask rather than high flow.  Devika's mom dropped off food from home, also drinking ensure supplements. Voiding well. Denies pain. Low risk for falls and pressure ulcers. Denies needs. Hopes to discharge home soon- missing her children/family. Loretta Whitaker RN    Problem: Pain Chronic (Persistent)  Goal: Acceptable Pain Control and Functional Ability  Outcome: Improving     Problem: Gas Exchange Impaired  Goal: Optimal Gas Exchange  Outcome: Improving     Problem: Adult Inpatient Plan of Care  Goal: Optimal Comfort and Wellbeing  Outcome: Improving     Problem: Adult Inpatient Plan of Care  Goal: Absence of Hospital-Acquired Illness or Injury  Intervention: Identify and Manage Fall Risk  Recent Flowsheet Documentation  Taken 11/14/2021 1226 by Loretta Whitaker RN  Safety Promotion/Fall Prevention: nonskid shoes/slippers when out of bed  Taken 11/14/2021 0900 by Loretta Whitaker, RN  Safety Promotion/Fall Prevention: nonskid shoes/slippers when out of bed

## 2021-11-14 NOTE — PLAN OF CARE
Pt is on HFNC at 35% and 30 L; pt having anxiety; gave 25 mg of atarax. No pain. Lungs diminished. Refused inhaler.  Vitals stable.  Getting subcutaneous lovenox.  Independent in room.    Problem: Adult Inpatient Plan of Care  Goal: Absence of Hospital-Acquired Illness or Injury  Intervention: Identify and Manage Fall Risk  Recent Flowsheet Documentation  Taken 11/13/2021 2100 by Samantha Metzger, KIEL  Safety Promotion/Fall Prevention: nonskid shoes/slippers when out of bed  Intervention: Prevent Skin Injury  Recent Flowsheet Documentation  Taken 11/13/2021 2200 by Samantha Metzger, RN  Body Position: position changed independently  Taken 11/13/2021 2100 by Samantha Metzger, RN  Body Position: position changed independently     Problem: Gas Exchange Impaired  Goal: Optimal Gas Exchange  Intervention: Optimize Oxygenation and Ventilation  Recent Flowsheet Documentation  Taken 11/13/2021 2200 by Samantha Metzger, RN  Head of Bed (HOB) Positioning: HOB at 15 degrees  Taken 11/13/2021 2100 by Samantha Metzger RN  Head of Bed (HOB) Positioning: HOB at 15 degrees     Problem: Pain Chronic (Persistent)  Goal: Acceptable Pain Control and Functional Ability  Intervention: Manage Persistent Pain  Recent Flowsheet Documentation  Taken 11/13/2021 2100 by Samantha Metzger, RN  Medication Review/Management: medications reviewed

## 2021-11-14 NOTE — PLAN OF CARE
Problem: Risk for Delirium  Goal: Improved Sleep  Outcome: No Change   She was getting upset because her oximeter alarm would go off as her heart rate would drop below 50.  Changed parameters to 48 for low rate, which helped a little.

## 2021-11-14 NOTE — PROGRESS NOTES
Pt is off HFNC and on oxymask 6 LPM. Saturation maintained at 95%.  Will continue to monitor.    Yvon Britton, RT

## 2021-11-14 NOTE — PROGRESS NOTES
"RT PROGRESS NOTE  CURRENT HIGH FLOW SETTINGS:  LITER FLOW: 30 LPM              FIO2:   35%        NOTE / SHIFT SUMMARY:   Uneventful night from resp care stand point. Remains on the above parameters. Awake, alert, and oriented. Normal respirations--see above VS. Will continue to titrate down parameters.     BP (!) 147/70 (BP Location: Left arm, Patient Position: Right side)   Pulse 55   Temp 97.7  F (36.5  C) (Oral)   Resp 18   Ht 1.854 m (6' 1\")   Wt 61.1 kg (134 lb 11.2 oz)   SpO2 96%   BMI 17.77 kg/m       Lopez Jameson, RRT   "

## 2021-11-15 ENCOUNTER — DOCUMENTATION ONLY (OUTPATIENT)
Dept: OTHER | Facility: CLINIC | Age: 36
End: 2021-11-15
Payer: COMMERCIAL

## 2021-11-15 VITALS
WEIGHT: 134.7 LBS | SYSTOLIC BLOOD PRESSURE: 131 MMHG | RESPIRATION RATE: 18 BRPM | OXYGEN SATURATION: 93 % | BODY MASS INDEX: 18.24 KG/M2 | HEART RATE: 61 BPM | HEIGHT: 72 IN | TEMPERATURE: 98.3 F | DIASTOLIC BLOOD PRESSURE: 78 MMHG

## 2021-11-15 LAB
HOLD SPECIMEN: NORMAL
HOLD SPECIMEN: NORMAL
MAGNESIUM SERPL-MCNC: 1.8 MG/DL (ref 1.8–2.6)
POTASSIUM BLD-SCNC: 3.2 MMOL/L (ref 3.5–5)

## 2021-11-15 PROCEDURE — 94761 N-INVAS EAR/PLS OXIMETRY MLT: CPT

## 2021-11-15 PROCEDURE — 250N000013 HC RX MED GY IP 250 OP 250 PS 637: Performed by: INTERNAL MEDICINE

## 2021-11-15 PROCEDURE — 83735 ASSAY OF MAGNESIUM: CPT | Performed by: HOSPITALIST

## 2021-11-15 PROCEDURE — 99239 HOSP IP/OBS DSCHRG MGMT >30: CPT | Performed by: HOSPITALIST

## 2021-11-15 PROCEDURE — 250N000012 HC RX MED GY IP 250 OP 636 PS 637: Performed by: HOSPITALIST

## 2021-11-15 PROCEDURE — 84132 ASSAY OF SERUM POTASSIUM: CPT | Performed by: HOSPITALIST

## 2021-11-15 PROCEDURE — 250N000011 HC RX IP 250 OP 636: Performed by: HOSPITALIST

## 2021-11-15 PROCEDURE — 36415 COLL VENOUS BLD VENIPUNCTURE: CPT | Performed by: HOSPITALIST

## 2021-11-15 RX ORDER — DEXAMETHASONE 6 MG/1
6 TABLET ORAL
Qty: 7 TABLET | Refills: 0 | Status: SHIPPED | OUTPATIENT
Start: 2021-11-15 | End: 2021-11-15

## 2021-11-15 RX ORDER — DEXAMETHASONE 6 MG/1
6 TABLET ORAL
Qty: 3 TABLET | Refills: 0 | Status: SHIPPED | OUTPATIENT
Start: 2021-11-15 | End: 2021-11-18

## 2021-11-15 RX ORDER — DEXAMETHASONE 2 MG/1
6 TABLET ORAL DAILY
Status: DISCONTINUED | OUTPATIENT
Start: 2021-11-15 | End: 2021-11-15 | Stop reason: HOSPADM

## 2021-11-15 RX ORDER — ALBUTEROL SULFATE 90 UG/1
2 AEROSOL, METERED RESPIRATORY (INHALATION) EVERY 4 HOURS PRN
Qty: 8 G | Refills: 0 | Status: SHIPPED | OUTPATIENT
Start: 2021-11-15 | End: 2023-03-01

## 2021-11-15 RX ADMIN — DEXAMETHASONE 6 MG: 2 TABLET ORAL at 10:39

## 2021-11-15 RX ADMIN — HYDROXYZINE HYDROCHLORIDE 25 MG: 25 TABLET, FILM COATED ORAL at 00:48

## 2021-11-15 RX ADMIN — BENZONATATE 100 MG: 100 CAPSULE ORAL at 10:39

## 2021-11-15 RX ADMIN — HYDROXYZINE HYDROCHLORIDE 25 MG: 25 TABLET, FILM COATED ORAL at 10:40

## 2021-11-15 RX ADMIN — ENOXAPARIN SODIUM 30 MG: 30 INJECTION SUBCUTANEOUS at 10:40

## 2021-11-15 RX ADMIN — FAMOTIDINE 20 MG: 20 TABLET, FILM COATED ORAL at 10:40

## 2021-11-15 RX ADMIN — ALBUTEROL SULFATE 4 PUFF: 90 AEROSOL, METERED RESPIRATORY (INHALATION) at 10:43

## 2021-11-15 RX ADMIN — NICOTINE 1 PATCH: 14 PATCH, EXTENDED RELEASE TRANSDERMAL at 10:44

## 2021-11-15 RX ADMIN — BARICITINIB 4 MG: 2 TABLET, FILM COATED ORAL at 10:40

## 2021-11-15 ASSESSMENT — ACTIVITIES OF DAILY LIVING (ADL)
ADLS_ACUITY_SCORE: 5

## 2021-11-15 NOTE — PLAN OF CARE
A/O x4. Denied pain. Independent with ADLs. Discharged to home with meds at 1235. Education provided on meds and appointments. Verbalized understanding.

## 2021-11-15 NOTE — PLAN OF CARE
Problem: Risk for Delirium  Goal: Improved Sleep  Outcome: Improving     Problem: Gas Exchange Impaired  Goal: Optimal Gas Exchange  Outcome: Improving  Intervention: Optimize Oxygenation and Ventilation  Recent Flowsheet Documentation  Taken 11/15/2021 0045 by Samantha Martel RN  Head of Bed (HOB) Positioning: HOB at 20-30 degrees     Problem: Adult Inpatient Plan of Care  Goal: Optimal Comfort and Wellbeing  Outcome: Improving   Patient alert, oriented x 4. Denied shortness of breath. Complained of feeling anxious, administered Hydroxyzine prn, reported feeling calm. On 6 liters oxy mask, saturation 92-95 %. Will continue to monitor.

## 2021-11-15 NOTE — PROGRESS NOTES
HOME OXYGEN EVALUATION NOTE    Devika Velázquez was evaluated for home oxygen on 11/15/2021,    At rest on room air 91%    With activity on room air 86%  With activity on 1L nasal cannula 88%  With activity on 2L nasal cannula 92%    Patient does  qualify for oxygen at this time based on Medicare guidelines.    Pawel Rea, RT

## 2021-11-15 NOTE — PROGRESS NOTES
I certify that this patient, Devika Velázquez has been under my care. This is the face-to-face encounter for oxygen medical necessity.      Devika Velázquez is now in a stable state and continues to require supplemental oxygen. Patient has continued oxygen desaturation due to COVID-19 viral pneumonia. To limit community spread of the virus, and to facilitate earlier discharge from hospital and conservation of scarce hospital resources during pandemic, isolation at home with home O2 is indicated.    Alternative treatment(s) tried or considered and deemed clinically infective for treatment of COVID-19 viral pneumonia include steroids and pulmonary toileting.  If portability is ordered, is the patient mobile within the home? Yes    **Patients who qualify for home O2 coverage under the CMS guidelines require ABG tests or O2 sat readings obtained closest to, but no earlier than 2 days prior to the discharge, as evidence of the need for home oxygen therapy. Testing must be performed while patient is in the chronic stable state. See notes for O2 sats.**    Ml Paz MD  [facility] Medicine Service

## 2021-11-15 NOTE — PLAN OF CARE
Problem: Pain Chronic (Persistent)  Goal: Acceptable Pain Control and Functional Ability  Outcome: Improving     Problem: Anxiety  Goal: Anxiety Reduction or Resolution  Outcome: Improving     Pt's vitals stable. Denied any pain through out the shift. Pt on 6 liter oxymask. Her oxygen saturation has been 93-94% on 6 liter oxymask. Pt was able to get up to 2500 for IS. Pt independent in room. Gave atarax for anxiety per request, which was helpful. Pt on mg and potassium protocol. Labs check in am.

## 2021-11-15 NOTE — DISCHARGE SUMMARY
[facility] MEDICINE  DISCHARGE SUMMARY     Primary Care Physician: Violetta Mendiola  Admission Date: 11/8/2021   Discharge Provider: Ml Paz Discharge Date: 11/15/2021   Diet:   Active Diet and Nourishment Order   Procedures     Snacks/Supplements Adult: Ensure Enlive; Between Meals     Combination Diet Regular Diet Adult     Diet       Code Status: Full Code   Activity: DCACTIVITY: Activity as tolerated        Condition at Discharge: Stable     REASON FOR PRESENTATION(See Admission Note for Details)   dyspnea    PRINCIPAL & ACTIVE DISCHARGE DIAGNOSES     Principal Problem:    Pneumonia due to 2019 novel coronavirus  Active Problems:    Smoker    Hypoxemia    Hypokalemia    Hypomagnesemia    Acute respiratory failure with hypoxia (H)    LFT elevation    Underweight      PENDING LABS     Unresulted Labs Ordered in the Past 30 Days of this Admission     No orders found from 10/9/2021 to 11/9/2021.          PROCEDURES ( this hospitalization only)      none    RECOMMENDATIONS TO OUTPATIENT PROVIDER FOR F/U VISIT     Follow-up Appointments     Follow-up and recommended labs and tests       Follow up with primary care provider, Violetta Mendiola, within 7   days for hospital follow- up.  You should get vaccinated for COVID-19. We recommend waiting until 3 weeks   following your COVID-19 illness.             DISPOSITION     Home    SUMMARY OF HOSPITAL COURSE:      Devika Velázquez is a 36 year old female admitted on 11/8/2021. She has history of tobacco abuse and ADHD, presented for dyspnea found to have COVID-19     # Confirmed COVID-19 infection    # Acute Hypoxic Respiratory Failure secondary to COVID-19 infection  # Viral Pneumonia secondary to COVID-19 infection     Symptom Onset November 1, 2021   Date of 1st Positive Test November 8, 2021   Vaccination Status Not Vaccinated         -Patient initially with severe hypoxia admitted to the ICU for high flow nasal  cannula, never needed intubation.  Out of ICU on 11/12.  Seems to be rapidly improving now, yesterday on 7 L oxygen mask, today on 0 to 2 L nasal cannula and ready to discharge home.  She will discharged with home oxygen.  -Continue on Decadron for 10-day course, or until completely off of oxygen, which ever is sooner.  Same with baricitinib, except for 14-day course.  - DVT Prophylaxis: at high risk of thrombotic complications due to COVID-19 (DDimer = 0.82).          - Xarelto x30 days  -Discharging home with home oxygen today     #Abnormal LFTs  Likely related to viral illness though CT scan showed prominent fatty infiltration of liver, concerning given young age and would recommend lifestyle modification  Follow-up as outpatient    Discharge Medications with Med changes:     Current Discharge Medication List      START taking these medications    Details   albuterol (PROAIR HFA/PROVENTIL HFA/VENTOLIN HFA) 108 (90 Base) MCG/ACT inhaler Inhale 2 puffs into the lungs every 4 hours as needed for shortness of breath / dyspnea or wheezing  Qty: 8 g, Refills: 0    Comments: Pharmacy may dispense brand covered by insurance (Proair, or proventil or ventolin or generic albuterol inhaler)  Associated Diagnoses: Pneumonia due to 2019 novel coronavirus      baricitinib (OLUMIANT) 2 MG tablet Take 2 tablets (4 mg) by mouth daily for 7 days Continue for 7 days or until you are off of oxygen, whichever is sooner.  Qty: 14 tablet, Refills: 0    Associated Diagnoses: Pneumonia due to 2019 novel coronavirus      dexamethasone (DECADRON) 6 MG tablet Take 1 tablet (6 mg) by mouth daily (with breakfast) for 3 days . Continue for 3 days or until you are off of oxygen, whichever is sooner.  Qty: 3 tablet, Refills: 0    Comments: Disregard previous dispense amount of 7 tabs/7 days  Associated Diagnoses: Pneumonia due to 2019 novel coronavirus      rivaroxaban ANTICOAGULANT (XARELTO) 10 MG TABS tablet Take 1 tablet (10 mg) by mouth daily  (with dinner)  Qty: 30 tablet, Refills: 0    Associated Diagnoses: Pneumonia due to 2019 novel coronavirus         CONTINUE these medications which have NOT CHANGED    Details   levonorgestrel-ethinyl estradiol (AVIANE/ALESSE/LESSINA) 0.1-20 MG-MCG tablet Take 1 tablet by mouth At Bedtime                Consults     INFECTIOUS DISEASES IP CONSULT  SOCIAL WORK IP CONSULT  SOCIAL WORK IP CONSULT  PHARMACY IP CONSULT    SIGNIFICANT IMAGING FINDINGS     Results for orders placed or performed during the hospital encounter of 11/08/21   Chest XR,  PA & LAT    Impression    IMPRESSION: Bilateral patchy pulmonary infiltrates which can be seen with viral pneumonias. No pleural effusion. Heart size appears normal.   CT Chest Pulmonary Embolism w Contrast    Impression    IMPRESSION:  1.  No pulmonary emboli identified.  2.  Extensive groundglass infiltrates bilaterally consistent with COVID pneumonia.    Commonly reported imaging features of (COVID-19) pneumonia are present. Influenza pneumonia and organizing pneumonia as can be seen in the setting of drug toxicity and connective tissue disease can cause a similar imaging pattern.       SIGNIFICANT LABORATORY FINDINGS     See emr    Discharge Orders        COVID-19 GetWell Loop Referral      Reason for your hospital stay    COVID-19     Follow-up and recommended labs and tests     Follow up with primary care provider, Violetta Mendiola, within 7 days for hospital follow- up.  You should get vaccinated for COVID-19. We recommend waiting until 3 weeks following your COVID-19 illness.     Activity    Your activity upon discharge: activity as tolerated     Contact provider    Contact your primary care provider if If increased trouble breathing, new arm/leg swelling, dizziness/passing out, falls, bleeding that doesn't stop, or uncontrolled pain.     Discharge - Quarantine/Isolation Instruction    Date of symptom onset: November 1, 2021   Date of first positive test: November  8, 2021  Stay home and away from others (self-isolate) for at least 20 days from when your symptoms started And...   You've had no fevers and no medicine that reduces fever for 1 full day (24 hours)  And...   Your other symptoms have resolved (gotten better).     Home Oxygen Order for DME - ONLY FOR DME    Oxygen Documentation:   I certify that this patient, Devika Velázquez has been under my care (or a nurse practitioner or physican's assistant working with me). This is the face-to-face encounter for oxygen medical necessity.      Devika Velázquez is now in a chronic stable state and continues to require supplemental oxygen. Patient has continued oxygen desaturation due to Covid.    Alternative treatment(s) tried or considered and deemed clinically infective for treatment of covid.  If portability is ordered, is the patient mobile within the home? yes    **Patients who qualify for home O2 coverage under the CMS guidelines require ABG tests or O2 sat readings obtained closest to, but no earlier than 2 days prior to the discharge, as evidence of the need for home oxygen therapy. Testing must be performed while patient is in the chronic stable state. See notes for O2 sats.**     Home Oxygen Order for DME - ONLY FOR DME    I, the undersigned, certify that the above prescribed supplies are medically necessary for this patient and is both reasonable and necessary in reference to accepted standards of medical and necessary in reference to accepted standards of medical practice in the treatment of this patient's condition and is not prescribed as a convenience.      Diet    Follow this diet upon discharge: resume your regular diet       Examination   Physical Exam   Temp:  [98.3  F (36.8  C)-98.5  F (36.9  C)] 98.3  F (36.8  C)  Pulse:  [61-83] 61  Resp:  [18-20] 18  BP: (123-148)/(78-80) 131/78  FiO2 (%):  [37 %] 37 %  SpO2:  [86 %-96 %] 93 %  Wt Readings from Last 1 Encounters:   11/13/21 61.1 kg (134 lb 11.2 oz)        General: in no apparent distress, non-toxic and alert female lying in hospital bed oriented x3  HEENT: Head normocephalic atraumatic, oral mucosa moist. Sclerae anicteric  Skin: No rashes or lesions  Extremities: No peripheral edema. Very thin extremities  Psych: Normal affect, mood euthymic  Neuro: CNII-XII grossly intact, moving all 4 extremities    Please see EMR for more detailed significant labs, imaging, consultant notes etc.    I, Ml Paz MD, personally saw the patient today and spent greater than 30 minutes discharging this patient.    Ml Paz MD  [facility]    CC:Violetta Mendiola

## 2022-05-11 ENCOUNTER — LAB REQUISITION (OUTPATIENT)
Dept: LAB | Facility: CLINIC | Age: 37
End: 2022-05-11

## 2022-05-11 PROCEDURE — 86787 VARICELLA-ZOSTER ANTIBODY: CPT | Performed by: INTERNAL MEDICINE

## 2022-05-11 PROCEDURE — 86481 TB AG RESPONSE T-CELL SUSP: CPT | Performed by: INTERNAL MEDICINE

## 2022-05-12 LAB
VZV IGG SER QL IA: 2349 INDEX
VZV IGG SER QL IA: POSITIVE

## 2022-05-13 LAB
GAMMA INTERFERON BACKGROUND BLD IA-ACNC: 0.05 IU/ML
M TB IFN-G BLD-IMP: NEGATIVE
M TB IFN-G CD4+ BCKGRND COR BLD-ACNC: 0.82 IU/ML
MITOGEN IGNF BCKGRD COR BLD-ACNC: -0.02 IU/ML
MITOGEN IGNF BCKGRD COR BLD-ACNC: -0.02 IU/ML
QUANTIFERON MITOGEN: 0.87 IU/ML
QUANTIFERON NIL TUBE: 0.05 IU/ML
QUANTIFERON TB1 TUBE: 0.03 IU/ML
QUANTIFERON TB2 TUBE: 0.03

## 2022-09-06 NOTE — PROGRESS NOTES
"[facility] Progress Note    SUBJECTIVE: I debated waking the pt and just don't think worth doing as she is sleeping in fetal position and comfortable.  I have reviewed her chart and the notes of resp therapy.  She's on way more oxygen than was in ER now, ICU cares underway.         REVIEW OF SYSTEMS:  Negative unless as listed above.  Social history, Family history, Medications: reviewed.    OBJECTIVE:  /64   Pulse 91   Temp 98.8  F (37.1  C) (Oral)   Resp 28   Ht 1.854 m (6' 1\")   Wt 53.9 kg (118 lb 14.4 oz)   SpO2 96%   BMI 15.69 kg/m                PHYSICAL EXAM:  Resting in bed  No rash  Face mask and hi flow    Antibiotics:none    Pertinent labs:  Recent Labs   Lab Test 11/10/21  0411 11/09/21  0845 11/08/21  2145   HGB 12.4 12.9 13.6   PLT 89* 79* 79*   WBC 3.0* 1.8* 2.8*   * 111* 109*   MCHC 33.6 33.6 34.1   RDW 12.9 13.0 13.0     Recent Labs   Lab Test 11/10/21  0411 11/09/21  0845 11/08/21  2145 01/01/14  0151    141 139 132*   CHLORIDE 107 107 102 108   CO2 26 22 27 21   ANIONGAP 9 12 10 4*   BUN 10 4* 4* 11   CR 0.53* 0.50* 0.54* 0.59   KOKI 8.5 8.3* 9.0 8.1*    136* 101 93   ALKPHOS  --  186* 196* 99   AST  --  227* 302* 22   ALT  --  76* 92* 31   PROTTOTAL  --  6.3 6.9 5.9*   ALBUMIN  --  2.9* 3.1* 3.0*   BILITOTAL  --  1.6* 1.8* 0.2   GFRESTIMATED >90 >90 >90 >90               MICROBIOLOGY DATA:  reviewed      IMAGING/RADIOLOGY:          ASSESSMENT:  Covid in otherwise healthy female likely family exposure see my consult  LFTs up, plts down typical of a serious viral infection    RECOMMENDATION:  Continue ICU cares, she's maxed out on covid therapies (none of which are home runs)    RASHAAD Crow MD  Office [phone] option 2 to desk staff  " Area H Indication Text: Tumors in this location are included in Area H (eyelids, eyebrows, nose, lips, chin, ear, pre-auricular, post-auricular, temple, genitalia, hands, feet, ankles and areola).  Tissue conservation is critical in these anatomic locations.

## 2023-03-01 ENCOUNTER — OFFICE VISIT (OUTPATIENT)
Dept: PODIATRY | Facility: CLINIC | Age: 38
End: 2023-03-01
Payer: COMMERCIAL

## 2023-03-01 ENCOUNTER — ANCILLARY PROCEDURE (OUTPATIENT)
Dept: GENERAL RADIOLOGY | Facility: CLINIC | Age: 38
End: 2023-03-01
Attending: PODIATRIST
Payer: COMMERCIAL

## 2023-03-01 VITALS
DIASTOLIC BLOOD PRESSURE: 111 MMHG | WEIGHT: 138 LBS | HEIGHT: 72 IN | BODY MASS INDEX: 18.69 KG/M2 | HEART RATE: 71 BPM | SYSTOLIC BLOOD PRESSURE: 169 MMHG

## 2023-03-01 DIAGNOSIS — S93.401A MODERATE RIGHT ANKLE SPRAIN, INITIAL ENCOUNTER: Primary | ICD-10-CM

## 2023-03-01 DIAGNOSIS — M79.671 ACUTE FOOT PAIN, RIGHT: ICD-10-CM

## 2023-03-01 PROCEDURE — 73630 X-RAY EXAM OF FOOT: CPT | Mod: TC | Performed by: RADIOLOGY

## 2023-03-01 PROCEDURE — 99203 OFFICE O/P NEW LOW 30 MIN: CPT | Performed by: PODIATRIST

## 2023-03-01 PROCEDURE — 73610 X-RAY EXAM OF ANKLE: CPT | Mod: TC | Performed by: RADIOLOGY

## 2023-03-01 ASSESSMENT — PAIN SCALES - GENERAL: PAINLEVEL: MILD PAIN (2)

## 2023-03-01 NOTE — NURSING NOTE
Chief Complaint   Patient presents with     Consult     Right ankle injury       Initial BP (!) 169/111   Pulse 71   Ht 1.829 m (6')   Wt 62.6 kg (138 lb)   BMI 18.72 kg/m   Estimated body mass index is 18.72 kg/m  as calculated from the following:    Height as of this encounter: 1.829 m (6').    Weight as of this encounter: 62.6 kg (138 lb).  Medications and allergies reviewed.      Valentina CASTREJON MA

## 2023-03-01 NOTE — LETTER
3/1/2023         RE: Devika Velázquez  [address] [facility] [address]        Dear Colleague,    Thank you for referring your patient, Devika Velázquez, to the [facility] [place]. Please see a copy of my visit note below.    PATIENT HISTORY:  Devika Velázquez is a 38 year old female who presents to clinic as a self referral with a chief complaint of a painful right ankle.  The patient is seen by themselves.  The patient relates twisting the right ankle recently with pain and swelling.  Any previous notes and studies that pertain to the patient's condition were reviewed.    Pertinent medical, surgical and family history was reviewed in the [facility] chart.    Past Medical History:   Past Medical History:   Diagnosis Date     ADHD      Allergic state        Medications:   Current Outpatient Medications:      levonorgestrel-ethinyl estradiol (AVIANE/ALESSE/LESSINA) 0.1-20 MG-MCG tablet, Take 1 tablet by mouth At Bedtime , Disp: , Rfl:      dexamethasone (DECADRON) 6 MG tablet, Take 1 tablet (6 mg) by mouth daily (with breakfast) for 3 days . Continue for 3 days or until you are off of oxygen, whichever is sooner., Disp: 3 tablet, Rfl: 0     Allergies:    Allergies   Allergen Reactions     Amoxicillin Rash     Morphine Hives     Pollen Extract-Tree Extract [Pollen Extract]      Sulfa Drugs Rash     Levofloxacin Hives, Itching and Rash     rash         Vitals: BP (!) 169/111   Pulse 71   Ht 1.829 m (6')   Wt 62.6 kg (138 lb)   BMI 18.72 kg/m    BMI= Body mass index is 18.72 kg/m .    LOWER EXTREMITY PHYSICAL EXAM    Dermatologic: Skin is intact to right lower extremity without significant lesions, rash or abrasion.        Vascular: DP & PT pulses are intact & regular on the right.   CFT and skin temperature is normal to the right lower extremity.     Neurologic: Lower extremity sensation is intact to light touch.  No evidence of weakness in the right lower extremity.         Musculoskeletal: Patient is ambulatory without assistive device or brace.  No gross ankle deformity noted.  No foot or ankle joint effusion is noted.  Noted edema around the right ankle.  Noted pain on palpation over the Anterior Talofibular ligament on the right.    Diagnostics:  Radiographs included three views of the right foot and ankle demonstrating no cortical erosions or periosteal elevation.  All joint margins appear stable.  There is no apparent fracture or tumor formation noted.  There is no evidence of foreign body.  The images were independently reviewed by myself along with the patient explaining the findings.      ASSESSMENT / PLAN:     ICD-10-CM    1. Moderate right ankle sprain, initial encounter  S93.401A XR Ankle Right G/E 3 Views     Ankle/Foot Bracing Supplies DME Ankle Brace; Right      2. Acute foot pain, right  M79.671 XR Foot Right G/E 3 Views          I have explained to Devika about the conditions.  We discussed the underlying contributing factors to the condition as well as treatment options along with expected length of recovery.  At this time, the patient will continue to offload the right ankle.  The patient was fitted with a Trilok ankle brace that will aid in offloading the tension forces to the soft tissues and prevent further inflammation.  The patient was given instructions on soaking and light range of motion exercises.  The patient may return to the office in one month for reevaluation if problems persist.    Devika verbalized agreement with and understanding of the rational for the diagnosis and treatment plan.  All questions were answered to best of my ability and the patient's satisfaction. The patient was advised to contact the clinic with any questions that may arise after the clinic visit.      Disclaimer: This note consists of symbols derived from keyboarding, dictation and/or voice recognition software. As a result, there may be errors in the script that have gone  undetected. Please consider this when interpreting information found in this chart.       RASHAAD Ortega D.P.M., F.GARY.C.F.A.S.        Again, thank you for allowing me to participate in the care of your patient.        Sincerely,        Cipriano Ortega DPM

## 2023-03-01 NOTE — PROGRESS NOTES
PATIENT HISTORY:  Devika Velázquez is a 38 year old female who presents to clinic as a self referral with a chief complaint of a painful right ankle.  The patient is seen by themselves.  The patient relates twisting the right ankle recently with pain and swelling.  Any previous notes and studies that pertain to the patient's condition were reviewed.    Pertinent medical, surgical and family history was reviewed in the Epic chart.    Past Medical History:   Past Medical History:   Diagnosis Date     ADHD      Allergic state        Medications:   Current Outpatient Medications:      levonorgestrel-ethinyl estradiol (AVIANE/ALESSE/LESSINA) 0.1-20 MG-MCG tablet, Take 1 tablet by mouth At Bedtime , Disp: , Rfl:      dexamethasone (DECADRON) 6 MG tablet, Take 1 tablet (6 mg) by mouth daily (with breakfast) for 3 days . Continue for 3 days or until you are off of oxygen, whichever is sooner., Disp: 3 tablet, Rfl: 0     Allergies:    Allergies   Allergen Reactions     Amoxicillin Rash     Morphine Hives     Pollen Extract-Tree Extract [Pollen Extract]      Sulfa Drugs Rash     Levofloxacin Hives, Itching and Rash     rash         Vitals: BP (!) 169/111   Pulse 71   Ht 1.829 m (6')   Wt 62.6 kg (138 lb)   BMI 18.72 kg/m    BMI= Body mass index is 18.72 kg/m .    LOWER EXTREMITY PHYSICAL EXAM    Dermatologic: Skin is intact to right lower extremity without significant lesions, rash or abrasion.        Vascular: DP & PT pulses are intact & regular on the right.   CFT and skin temperature is normal to the right lower extremity.     Neurologic: Lower extremity sensation is intact to light touch.  No evidence of weakness in the right lower extremity.        Musculoskeletal: Patient is ambulatory without assistive device or brace.  No gross ankle deformity noted.  No foot or ankle joint effusion is noted.  Noted edema around the right ankle.  Noted pain on palpation over the Anterior Talofibular ligament on the  right.    Diagnostics:  Radiographs included three views of the right foot and ankle demonstrating no cortical erosions or periosteal elevation.  All joint margins appear stable.  There is no apparent fracture or tumor formation noted.  There is no evidence of foreign body.  The images were independently reviewed by myself along with the patient explaining the findings.      ASSESSMENT / PLAN:     ICD-10-CM    1. Moderate right ankle sprain, initial encounter  S93.401A XR Ankle Right G/E 3 Views     Ankle/Foot Bracing Supplies DME Ankle Brace; Right      2. Acute foot pain, right  M79.671 XR Foot Right G/E 3 Views          I have explained to Devika about the conditions.  We discussed the underlying contributing factors to the condition as well as treatment options along with expected length of recovery.  At this time, the patient will continue to offload the right ankle.  The patient was fitted with a Trilok ankle brace that will aid in offloading the tension forces to the soft tissues and prevent further inflammation.  The patient was given instructions on soaking and light range of motion exercises.  The patient may return to the office in one month for reevaluation if problems persist.    Devika verbalized agreement with and understanding of the rational for the diagnosis and treatment plan.  All questions were answered to best of my ability and the patient's satisfaction. The patient was advised to contact the clinic with any questions that may arise after the clinic visit.      Disclaimer: This note consists of symbols derived from keyboarding, dictation and/or voice recognition software. As a result, there may be errors in the script that have gone undetected. Please consider this when interpreting information found in this chart.       RASHAAD Ortega D.P.M., FLUIS ANGEL.F.A.S.

## 2024-05-24 ENCOUNTER — HOSPITAL ENCOUNTER (OUTPATIENT)
Dept: CT IMAGING | Facility: CLINIC | Age: 39
Discharge: HOME | End: 2024-05-24
Attending: OTOLARYNGOLOGY | Admitting: OTOLARYNGOLOGY
Payer: COMMERCIAL

## 2024-05-24 DIAGNOSIS — R09.81 SINUS CONGESTION: ICD-10-CM

## 2024-05-24 PROCEDURE — 70486 CT MAXILLOFACIAL W/O DYE: CPT

## 2024-06-22 ENCOUNTER — HEALTH MAINTENANCE LETTER (OUTPATIENT)
Age: 39
End: 2024-06-22

## 2024-12-10 ENCOUNTER — TELEPHONE (OUTPATIENT)
Dept: DERMATOLOGY | Facility: CLINIC | Age: 39
End: 2024-12-10
Payer: COMMERCIAL

## 2024-12-10 DIAGNOSIS — L30.9 DERMATITIS: Primary | ICD-10-CM

## 2024-12-10 RX ORDER — FLUOCINONIDE 0.5 MG/G
CREAM TOPICAL 2 TIMES DAILY
Qty: 120 G | Refills: 6 | Status: SHIPPED | OUTPATIENT
Start: 2024-12-10

## 2024-12-10 NOTE — TELEPHONE ENCOUNTER
Patient reports itching rash on antibiotic.     Pharmacy [facility].     Linnette Yu MA

## 2024-12-18 DIAGNOSIS — L30.9 DERMATITIS: ICD-10-CM

## 2024-12-18 RX ORDER — FLUOCINONIDE 0.5 MG/G
CREAM TOPICAL 2 TIMES DAILY
Qty: 120 G | Refills: 6 | Status: SHIPPED | OUTPATIENT
Start: 2024-12-18

## 2025-01-13 ENCOUNTER — TELEPHONE (OUTPATIENT)
Dept: DERMATOLOGY | Facility: CLINIC | Age: 40
End: 2025-01-13
Payer: COMMERCIAL

## 2025-01-13 DIAGNOSIS — L30.9 DERMATITIS: Primary | ICD-10-CM

## 2025-01-13 NOTE — TELEPHONE ENCOUNTER
Patient states that the cream isn't working anymore. ? Possible starting lightbox.     Marci Escobar LPN

## 2025-01-14 RX ORDER — BETAMETHASONE DIPROPIONATE 0.5 MG/G
CREAM TOPICAL
Qty: 300 G | Refills: 3 | Status: SHIPPED | OUTPATIENT
Start: 2025-01-14

## 2025-01-14 RX ORDER — PREDNISONE 10 MG/1
TABLET ORAL
Qty: 80 TABLET | Refills: 0 | Status: SHIPPED | OUTPATIENT
Start: 2025-01-14

## 2025-01-23 ENCOUNTER — OFFICE VISIT (OUTPATIENT)
Dept: DERMATOLOGY | Facility: CLINIC | Age: 40
End: 2025-01-23
Payer: COMMERCIAL

## 2025-01-23 DIAGNOSIS — D48.5 NEOPLASM OF UNCERTAIN BEHAVIOR OF SKIN: ICD-10-CM

## 2025-01-23 DIAGNOSIS — L81.4 LENTIGO: ICD-10-CM

## 2025-01-23 DIAGNOSIS — L70.0 ACNE VULGARIS: Primary | ICD-10-CM

## 2025-01-23 DIAGNOSIS — L85.8 KP (KERATOSIS PILARIS): ICD-10-CM

## 2025-01-23 RX ORDER — TRETINOIN 0.5 MG/G
CREAM TOPICAL
Qty: 45 G | Refills: 11 | Status: SHIPPED | OUTPATIENT
Start: 2025-01-23

## 2025-01-23 NOTE — PATIENT INSTRUCTIONS
Wound Care Instructions     FOR SUPERFICIAL WOUNDS     [facility]  [phone]                 AFTER 24 HOURS YOU SHOULD REMOVE THE BANDAGE AND BEGIN DAILY DRESSING CHANGES AS FOLLOWS:     1) Remove Dressing.     2) Clean and dry the area with tap water using a Q-tip or sterile gauze pad.     3) Apply Vaseline, Aquaphor, Polysporin ointment or Bacitracin ointment over entire wound.  Do NOT use Neosporin ointment.     4) Cover the wound with a band-aid, or a sterile non-stick gauze pad and micropore paper tape    REPEAT THESE INSTRUCTIONS AT LEAST ONCE A DAY UNTIL THE WOUND HAS COMPLETELY HEALED.    It is an old wives tale that a wound heals better when it is exposed to air and allowed to dry out. The wound will heal faster with a better cosmetic result if it is kept moist with ointment and covered with a bandage.    **Do not let the wound dry out.**    Supplies Needed:      *Cotton tipped applicators (Q-tips)    *Vaseline, Aquaphor, Polysporin or Bacitracin Ointment (NOT NEOSPORIN)    *Band-aids or non-stick gauze pads and micropore paper tape.      PATIENT INFORMATION:    During the healing process you will notice a number of changes. All wounds develop a small halo of redness surrounding the wound.  This means healing is occurring. Severe itching with extensive redness usually indicates sensitivity to the ointment or bandage tape used to dress the wound.  You should call our office if this develops.      Swelling  and/or discoloration around your surgical site is common, particularly when performed around the eye.    All wounds normally drain.  The larger the wound the more drainage there will be.  After 7-10 days, you will notice the wound beginning to shrink and new skin will begin to grow.  The wound is healed when you can see skin has formed over the entire area.  A healed wound has a healthy, shiny look to the surface and is red to dark pink in color to normalize.  Wounds may  take approximately 4-6 weeks to heal.  Larger wounds may take 6-8 weeks.  After the wound is healed you may discontinue dressing changes.    You may experience a sensation of tightness as your wound heals. This is normal and will gradually subside.    Your healed wound may be sensitive to temperature changes. This sensitivity improves with time, but if you re having a lot of discomfort, try to avoid temperature extremes.    Patients frequently experience itching after their wound appears to have healed because of the continue healing under the skin.  Plain Vaseline will help relieve the itching.      POSSIBLE COMPLICATIONS    BLEEDING:    Leave the bandage in place.  Use tightly rolled up gauze or a cloth to apply direct pressure over the bandage for 30  minutes.  Reapply pressure for an additional 30 minutes if necessary  Use additional gauze and tape to maintain pressure once the bleeding has stopped.

## 2025-01-23 NOTE — PROGRESS NOTES
HPI:   Chief complaints: Devika Velázquez is a pleasant 39 year old female who presents for evaluation of several spots of concern. She has changing brown spots on the legs and a changing mole on the back of the left shoulder. She is also on tretinoin 0.025% cream for acne but this has not been helping. She is not irritated on this strength.  She also has persistent red spots on the legs post drug eruption to amoxicillin.       PHYSICAL EXAM:    There were no vitals taken for this visit.  Skin exam performed as follows: Type 2 skin. Mood appropriate  Alert and Oriented X 3. Well developed, well nourished in no distress.  General appearance: Normal  Head including face: Normal  Eyes: conjunctiva and lids: Normal  Mouth: Lips, teeth, gums: Normal  Neck: Normal  Skin: Scalp and body hair: See below    Brown macules on the legs  Perifollicular erythema on the thighs  4 mm irregular brown macule on the left posterior shoulder  Age appropriate facial rhytids      ASSESSMENT/PLAN:     Lentigines/macular seborrheic keratoses - advised benign no treatment needed.   Keratosis pilaris - bilateral thighs. Discussed chronic eczematous condition. Discussed need for gentle emollients and soaps.   R/o atypical nevus on the left posterior shoulder. Shave biopsy performed.  Area cleaned and anesthetized with 1% lidocaine with epinephrine.  Dermablade used to remove the lesion and sent to pathology. Bleeding was cauterized. Pt tolerated procedure well with no complications.   Acne Vulgaris - advised on diagnosis and treatment options. Discussed use of topical medications and antibiotics.   --Increase to tretinoin 0.05% cream daily. Discussed potential for dryness/irritation. Advised to use emollients if needed.    Discussed Botox for crow's feet - she will consider this          Follow-up: PRN  CC:   Scribed By: Charla Andre, MS, PA-C

## 2025-01-23 NOTE — LETTER
1/23/2025      Devika Velázquez  [address] [facility] [address]      Dear Colleague,    Thank you for referring your patient, Devika Velázquez, to the [facility]. Please see a copy of my visit note below.    HPI:   Chief complaints: Devika Velázquez is a pleasant 39 year old female who presents for evaluation of several spots of concern. She has changing brown spots on the legs and a changing mole on the back of the left shoulder. She is also on tretinoin 0.025% cream for acne but this has not been helping. She is not irritated on this strength.  She also has persistent red spots on the legs post drug eruption to amoxicillin.       PHYSICAL EXAM:    There were no vitals taken for this visit.  Skin exam performed as follows: Type 2 skin. Mood appropriate  Alert and Oriented X 3. Well developed, well nourished in no distress.  General appearance: Normal  Head including face: Normal  Eyes: conjunctiva and lids: Normal  Mouth: Lips, teeth, gums: Normal  Neck: Normal  Skin: Scalp and body hair: See below    Brown macules on the legs  Perifollicular erythema on the thighs  4 mm irregular brown macule on the left posterior shoulder  Age appropriate facial rhytids      ASSESSMENT/PLAN:     Lentigines/macular seborrheic keratoses - advised benign no treatment needed.   Keratosis pilaris - bilateral thighs. Discussed chronic eczematous condition. Discussed need for gentle emollients and soaps.   R/o atypical nevus on the left posterior shoulder. Shave biopsy performed.  Area cleaned and anesthetized with 1% lidocaine with epinephrine.  Dermablade used to remove the lesion and sent to pathology. Bleeding was cauterized. Pt tolerated procedure well with no complications.   Acne Vulgaris - advised on diagnosis and treatment options. Discussed use of topical medications and antibiotics.   --Increase to tretinoin 0.05% cream daily. Discussed potential for dryness/irritation. Advised to use emollients  if needed.    Discussed Botox for crow's feet - she will consider this          Follow-up: PRN  CC:   Scribed By: Charla Andre, MS, PA-C      Again, thank you for allowing me to participate in the care of your patient.        Sincerely,        Charla Andre PA-C    Electronically signed

## 2025-01-27 LAB
PATH REPORT.COMMENTS IMP SPEC: NORMAL
PATH REPORT.COMMENTS IMP SPEC: NORMAL
PATH REPORT.FINAL DX SPEC: NORMAL
PATH REPORT.GROSS SPEC: NORMAL
PATH REPORT.MICROSCOPIC SPEC OTHER STN: NORMAL
PATH REPORT.RELEVANT HX SPEC: NORMAL

## 2025-02-05 NOTE — PROGRESS NOTES
"COLPOSCOPY    Devika Velázquez is a 39 year old  who presents for colposcopic evaluation of an abnormal Pap smear.    Previous Pap History:     22 Pap ASCUS-H, HR HPV+ (not genotyped)  3/30/22 Colpo SONNY 2-3 on Cervical biopsy and ECC-- LEEP advised    NO FOLLOW UP SINCE.  Patient reports she was nervous about the LEEP procedure and then just never got around to it.    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    BP (!) 141/101 (BP Location: Left arm, Patient Position: Sitting, Cuff Size: Adult Regular)   Pulse 100   Temp 98.2  F (36.8  C)   Ht 1.854 m (6' 1\")   Wt 60.7 kg (133 lb 12.8 oz)   BMI 17.65 kg/m      Procedure:    After informed consent was obtained, a speculum was placed in the patient's vagina and the cervix was visualized.  Pap was obtained.  A dilute solution of acetic acid was applied to the cervix.    Colposcopic findings:    Unremarkable vaginal fornices and vault.  Colposcopy: inadequate, could not see entire TZ    Abnormal findings: Tongue of AWE with punctation at 5 o'clock.  Additional AWE with ?mosaic around 7 o'clock.  Third tongue of AWE with slight punctation at 12-1 o'clock    Biopsies were performed at 5 o'clock, 7 o'clock, and 1 o'clock.  ECC was performed.  Hemostasis obtained using Monsell's solution.    Patient tolerated procedure well.  No complications.    Assessment/Plan: Colposcopy for abnormal Pap with above findings  1) Will notify patient of pathology results when available.  2) Final recommendations to follow. Discussed there is still a high possibility of needing LEEP but will await pathology/Pap results to be sure.  3) Discussed HPV vaccination, potential benefits even though she already has exposure.  Patient wishes to start series today.    Treva Barron MD   "

## 2025-02-06 ENCOUNTER — OFFICE VISIT (OUTPATIENT)
Dept: OBGYN | Facility: CLINIC | Age: 40
End: 2025-02-06
Payer: COMMERCIAL

## 2025-02-06 VITALS
SYSTOLIC BLOOD PRESSURE: 141 MMHG | HEIGHT: 72 IN | BODY MASS INDEX: 18.12 KG/M2 | DIASTOLIC BLOOD PRESSURE: 101 MMHG | HEART RATE: 100 BPM | TEMPERATURE: 98.2 F | WEIGHT: 133.8 LBS

## 2025-02-06 DIAGNOSIS — R87.611 PAP SMEAR OF CERVIX WITH ASCUS, CANNOT EXCLUDE HGSIL: Primary | ICD-10-CM

## 2025-02-06 DIAGNOSIS — D06.9 CIN III WITH SEVERE DYSPLASIA: ICD-10-CM

## 2025-02-06 DIAGNOSIS — R87.618 PAP SMEAR ABNORMALITY OF CERVIX/HUMAN PAPILLOMAVIRUS (HPV) POSITIVE: ICD-10-CM

## 2025-02-06 LAB
HCG UR QL: NEGATIVE
INTERNAL QC OK POCT: NORMAL
POCT KIT EXPIRATION DATE: NORMAL
POCT KIT LOT NUMBER: NORMAL

## 2025-02-06 RX ORDER — ACETAMINOPHEN AND CODEINE PHOSPHATE 120; 12 MG/5ML; MG/5ML
1 SOLUTION ORAL DAILY
COMMUNITY
Start: 2024-05-20

## 2025-02-06 RX ORDER — DEXTROAMPHETAMINE SACCHARATE, AMPHETAMINE ASPARTATE, DEXTROAMPHETAMINE SULFATE AND AMPHETAMINE SULFATE 5; 5; 5; 5 MG/1; MG/1; MG/1; MG/1
20 TABLET ORAL 2 TIMES DAILY
COMMUNITY
Start: 2025-01-22

## 2025-02-06 NOTE — PROGRESS NOTES
Prior to immunization administration, verified patients identity using patient s name and date of birth. Please see Immunization Activity for additional information.     Screening Questionnaire for Adult Immunization    Are you sick today?   No   Do you have allergies to medications, food, a vaccine component or latex?   No   Have you ever had a serious reaction after receiving a vaccination?   No   Do you have a long-term health problem with heart, lung, kidney, or metabolic disease (e.g., diabetes), asthma, a blood disorder, no spleen, complement component deficiency, a cochlear implant, or a spinal fluid leak?  Are you on long-term aspirin therapy?   No   Do you have cancer, leukemia, HIV/AIDS, or any other immune system problem?   No   Do you have a parent, brother, or sister with an immune system problem?   No   In the past 3 months, have you taken medications that affect  your immune system, such as prednisone, other steroids, or anticancer drugs; drugs for the treatment of rheumatoid arthritis, Crohn s disease, or psoriasis; or have you had radiation treatments?   No   Have you had a seizure, or a brain or other nervous system problem?   No   During the past year, have you received a transfusion of blood or blood    products, or been given immune (gamma) globulin or antiviral drug?   No   For women: Are you pregnant or is there a chance you could become       pregnant during the next month?   No   Have you received any vaccinations in the past 4 weeks?   No     Immunization questionnaire answers were all negative.      Patient instructed to remain in clinic for 15 minutes afterwards, and to report any adverse reactions.     Screening performed by Meghann Torres CMA on 2/6/2025 at 11:45 AM.

## 2025-02-10 LAB
HPV HR 12 DNA CVX QL NAA+PROBE: POSITIVE
HPV16 DNA CVX QL NAA+PROBE: POSITIVE
HPV18 DNA CVX QL NAA+PROBE: POSITIVE
HUMAN PAPILLOMA VIRUS FINAL DIAGNOSIS: ABNORMAL

## 2025-02-11 LAB
PATH REPORT.COMMENTS IMP SPEC: NORMAL
PATH REPORT.COMMENTS IMP SPEC: NORMAL
PATH REPORT.FINAL DX SPEC: NORMAL
PATH REPORT.GROSS SPEC: NORMAL
PATH REPORT.MICROSCOPIC SPEC OTHER STN: NORMAL
PATH REPORT.MICROSCOPIC SPEC OTHER STN: NORMAL
PATH REPORT.RELEVANT HX SPEC: NORMAL
PHOTO IMAGE: NORMAL

## 2025-02-13 ENCOUNTER — PATIENT OUTREACH (OUTPATIENT)
Dept: OBGYN | Facility: CLINIC | Age: 40
End: 2025-02-13
Payer: COMMERCIAL

## 2025-02-13 PROBLEM — R87.613 HSIL (HIGH GRADE SQUAMOUS INTRAEPITHELIAL LESION) ON PAP SMEAR OF CERVIX: Status: ACTIVE | Noted: 2025-02-06

## 2025-02-13 LAB
BKR AP ASSOCIATED HPV REPORT: ABNORMAL
BKR LAB AP GYN ADEQUACY: ABNORMAL
BKR LAB AP GYN INTERPRETATION: ABNORMAL
BKR LAB AP PREVIOUS ABNL DX: ABNORMAL
BKR LAB AP PREVIOUS ABNORMAL: ABNORMAL
PATH REPORT.COMMENTS IMP SPEC: ABNORMAL
PATH REPORT.COMMENTS IMP SPEC: ABNORMAL
PATH REPORT.RELEVANT HX SPEC: ABNORMAL

## 2025-02-13 NOTE — TELEPHONE ENCOUNTER
2/6/25 Colpo bx SONNY 1, 2-3, ECC SONNY 1. HSIL pap, +HR HPV 16, 18, & other. Plan LEEP due before 5/6/25

## 2025-03-26 NOTE — PROGRESS NOTES
"PROCEDURE: LOOP ELECTROSURGICAL EXCISION (LEEP)     Devika Velázquez is a  who presents for LEEP procedure.  Her Pap and biopsy history is as follows:    22 Pap ASCUS-H, HR HPV+ (not genotyped)  3/30/22 Colpo SONNY 2-3 on Cervical biopsy and ECC-- LEEP advised but not done    25 Pap HSIL, + HPV 16/18/other.  Colpo bx done same day with SONNY 2-3 on 5 and 7 o'clock biopsies      /90 (BP Location: Left arm, Patient Position: Sitting, Cuff Size: Adult Regular)   Pulse 112   Temp 98.3  F (36.8  C)   Ht 1.854 m (6' 1\")   Wt 57.8 kg (127 lb 6.4 oz)   LMP  (LMP Unknown)   BMI 16.81 kg/m      Procedure was explained to patient, all questions were answered, and written consent was signed.    Patient was placed in lithotomy position and a grounding pad was applied to her thigh.  The insulated speculum was inserted and a smoke evacuator was attached.    A weak acetic acid solution was applied to the cervix and the cervix was circumferentially injected with 20cc of 1% lidocaine with epinephrine 1:100,000.    Generator settings were checked and appropriate: blend 50 for LEEP and coag 25 for ball cautery.    Electrosurgical conization was performed in one pass using a 20mm x 12mm loop.    ECC was performed.    Specimens were collected and sent to pathology.    Hemostasis was obtained using a Ball electrocautery and Monsel's solution.    EBL: 10cc    The patient tolerated the procedure well and without complications.    Discharge instructions and precautions were reviewed verbally with patient.    Will notify of pathology results.    Treva Barron MD    "

## 2025-03-27 ENCOUNTER — OFFICE VISIT (OUTPATIENT)
Dept: OBGYN | Facility: CLINIC | Age: 40
End: 2025-03-27
Payer: COMMERCIAL

## 2025-03-27 VITALS
TEMPERATURE: 98.3 F | WEIGHT: 127.4 LBS | DIASTOLIC BLOOD PRESSURE: 90 MMHG | BODY MASS INDEX: 17.26 KG/M2 | HEIGHT: 72 IN | SYSTOLIC BLOOD PRESSURE: 128 MMHG | HEART RATE: 112 BPM

## 2025-03-27 DIAGNOSIS — D06.9 CIN III (CERVICAL INTRAEPITHELIAL NEOPLASIA GRADE III) WITH SEVERE DYSPLASIA: Primary | ICD-10-CM

## 2025-04-01 LAB
PATH REPORT.COMMENTS IMP SPEC: NORMAL
PATH REPORT.FINAL DX SPEC: NORMAL
PATH REPORT.GROSS SPEC: NORMAL
PATH REPORT.MICROSCOPIC SPEC OTHER STN: NORMAL
PATH REPORT.RELEVANT HX SPEC: NORMAL
PHOTO IMAGE: NORMAL

## 2025-04-02 PROBLEM — D06.9 HIGH GRADE SQUAMOUS INTRAEPITHELIAL LESION (HGSIL), GRADE 3 CIN, ON BIOPSY OF CERVIX: Status: ACTIVE | Noted: 2025-02-06

## 2025-04-03 ENCOUNTER — PATIENT OUTREACH (OUTPATIENT)
Dept: OBGYN | Facility: CLINIC | Age: 40
End: 2025-04-03
Payer: COMMERCIAL

## 2025-04-03 NOTE — TELEPHONE ENCOUNTER
3/27/25 LEEP Bx SONNY 1 - focally positive endocervical margin, ECC - at least SONNY 1. Plan cotest in 6 months.

## 2025-04-09 ENCOUNTER — ALLIED HEALTH/NURSE VISIT (OUTPATIENT)
Dept: OBGYN | Facility: CLINIC | Age: 40
End: 2025-04-09
Payer: COMMERCIAL

## 2025-04-09 DIAGNOSIS — D06.9 HIGH GRADE SQUAMOUS INTRAEPITHELIAL LESION (HGSIL), GRADE 3 CIN, ON BIOPSY OF CERVIX: Primary | ICD-10-CM

## 2025-04-09 PROCEDURE — 99207 PR NO CHARGE NURSE ONLY: CPT

## 2025-04-09 PROCEDURE — 90471 IMMUNIZATION ADMIN: CPT

## 2025-04-09 PROCEDURE — 90651 9VHPV VACCINE 2/3 DOSE IM: CPT

## 2025-04-09 NOTE — PROGRESS NOTES
Prior to immunization administration, verified patients identity using patient s name and date of birth. Please see Immunization Activity for additional information.     Screening Questionnaire for Adult Immunization    Are you sick today?   No   Do you have allergies to medications, food, a vaccine component or latex?   No   Have you ever had a serious reaction after receiving a vaccination?   No   Do you have a long-term health problem with heart, lung, kidney, or metabolic disease (e.g., diabetes), asthma, a blood disorder, no spleen, complement component deficiency, a cochlear implant, or a spinal fluid leak?  Are you on long-term aspirin therapy?   No   Do you have cancer, leukemia, HIV/AIDS, or any other immune system problem?   No   Do you have a parent, brother, or sister with an immune system problem?   No   In the past 3 months, have you taken medications that affect  your immune system, such as prednisone, other steroids, or anticancer drugs; drugs for the treatment of rheumatoid arthritis, Crohn s disease, or psoriasis; or have you had radiation treatments?   No   Have you had a seizure, or a brain or other nervous system problem?   No   During the past year, have you received a transfusion of blood or blood    products, or been given immune (gamma) globulin or antiviral drug?   No   For women: Are you pregnant or is there a chance you could become       pregnant during the next month?   No   Have you received any vaccinations in the past 4 weeks?   No     Immunization questionnaire answers were all negative.    I have reviewed the following standing orders:   This patient is due and qualifies for the HPV vaccine.    Click here for HPV (Adult 15-45Y) Standing Order     I have reviewed the vaccines inclusion and exclusion criteria;No concerns regarding eligibility.       Patient instructed to remain in clinic for 15 minutes afterwards, and to report any adverse reactions.     Screening performed by Meghann  GARY Torres CMA on 4/9/2025 at 1:53 PM.

## 2025-04-16 ENCOUNTER — OFFICE VISIT (OUTPATIENT)
Dept: OBGYN | Facility: CLINIC | Age: 40
End: 2025-04-16
Payer: COMMERCIAL

## 2025-04-16 VITALS
RESPIRATION RATE: 18 BRPM | HEIGHT: 72 IN | BODY MASS INDEX: 17.34 KG/M2 | WEIGHT: 128 LBS | DIASTOLIC BLOOD PRESSURE: 84 MMHG | SYSTOLIC BLOOD PRESSURE: 133 MMHG | TEMPERATURE: 98.7 F | HEART RATE: 78 BPM

## 2025-04-16 DIAGNOSIS — B96.89 BACTERIAL VAGINOSIS: ICD-10-CM

## 2025-04-16 DIAGNOSIS — N89.8 VAGINAL DISCHARGE: Primary | ICD-10-CM

## 2025-04-16 DIAGNOSIS — N76.0 BACTERIAL VAGINOSIS: ICD-10-CM

## 2025-04-16 LAB
CLUE CELLS: PRESENT
TRICHOMONAS, WET PREP: ABNORMAL
WBC'S/HIGH POWER FIELD, WET PREP: ABNORMAL
YEAST, WET PREP: ABNORMAL

## 2025-04-16 PROCEDURE — 87210 SMEAR WET MOUNT SALINE/INK: CPT | Performed by: PHYSICIAN ASSISTANT

## 2025-04-16 RX ORDER — CLINDAMYCIN HYDROCHLORIDE 300 MG/1
300 CAPSULE ORAL 2 TIMES DAILY
Qty: 14 CAPSULE | Refills: 0 | Status: SHIPPED | OUTPATIENT
Start: 2025-04-16 | End: 2025-04-23

## 2025-04-16 RX ORDER — FLUCONAZOLE 150 MG/1
TABLET ORAL
Qty: 2 TABLET | Refills: 0 | Status: SHIPPED | OUTPATIENT
Start: 2025-04-16 | End: 2025-04-19

## 2025-04-16 NOTE — NURSING NOTE
"Initial /84 (BP Location: Right arm, Patient Position: Chair, Cuff Size: Adult Regular)   Pulse 78   Temp 98.7  F (37.1  C) (Tympanic)   Resp 18   Ht 1.854 m (6' 1\")   Wt 58.1 kg (128 lb)   LMP  (LMP Unknown)   BMI 16.89 kg/m   Estimated body mass index is 16.89 kg/m  as calculated from the following:    Height as of this encounter: 1.854 m (6' 1\").    Weight as of this encounter: 58.1 kg (128 lb). .    "

## 2025-04-16 NOTE — PROGRESS NOTES
[facility]    Gynecology Office Note    CC:   Chief Complaint   Patient presents with    Vaginal Problem      HPI: Devika Velázquez is a 40 year old  who presents for vaginal discharge. 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 She states she had the typical charcoal discharge that then turned to reddish discharge and then peach discharge. The discharge went away for about 3 days then returned with more of of yellowish discharge. She states mild odor, but denies vaginal itching, burning, pelvic pain, fevers, chills, back pain, or UTI symptoms. She denies any concerns for STD.     Patient does not like flagyl or metrogel and would prefer clindamycin if needed for BV treatment.     GYN Hx:     No LMP recorded (lmp unknown). (Menstrual status: Birth Control). Patient takes pills continuously     Contraception: oral contraceptive   Last Pap Smear:   LEEP on 3/27/25  Abnormal Pap Smears: yes  Sexual Activity: currently sexually active with one partner. No sexual activity since LEEP     ROS: A 10 pt ROS was completed and found to be otherwise negative unless mentioned in the HPI.     PMH:   Past Medical History:   Diagnosis Date    ADHD     Allergic state        PSHx:   Past Surgical History:   Procedure Laterality Date    C/SECTION, LOW TRANSVERSE  2009    Breech    D & C      LAPAROTOMY EXPLORATORY      endometriosis    SURGICAL HISTORY OF -       oral surgery       OBHx:   OB History    Para Term  AB Living   7 2 2 0 4 2   SAB IAB Ectopic Multiple Live Births   4 0 0 0 2      # Outcome Date GA Lbr Nik/2nd Weight Sex Type Anes PTL Lv   7             6 Term 10/27/09 37w0d  2.977 kg (6 lb 9 oz) F CS-LTranv Spinal  MARCUS      Birth Comments: : Breech.      Apgar1: 9  Apgar5: 10   5 SAB 08 8w0d    SAB   DEC      Birth Comments: missed ab  d and c     4 SAB 08 9w0d    SAB   DEC       Birth Comments: 6 wk- d and c   3 Term 05 38w0d 03:00 2.948 kg (6 lb 8 oz) M    MARCUS      Birth Comments: CAN   2 SAB 04 10w0d    SAB   DEC      Birth Comments:    1 SAB 04 10w0d    SAB   DEC      Birth Comments: sab -twins       Medications:   Current Outpatient Medications   Medication Sig Dispense Refill    amphetamine-dextroamphetamine (ADDERALL) 20 MG tablet Take 20 mg by mouth 2 times daily.      fluocinonide (LIDEX) 0.05 % external cream Apply topically 2 times daily. Use for 7 days 120 g 6    norethindrone (MICRONOR) 0.35 MG tablet Take 1 tablet by mouth daily.      tretinoin (RETIN-A) 0.05 % external cream Spread a pea size amount into affected area topically at bedtime.  Use sunscreen SPF>20. 45 g 11     No current facility-administered medications for this visit.       Allergies:      Allergies   Allergen Reactions    Amoxicillin Rash    Morphine Hives    Pollen Extract-Tree Extract [Pollen Extract]     Sulfa Antibiotics Rash    Levofloxacin Hives, Itching and Rash     rash         Social History:   Social History     Socioeconomic History    Marital status: Single     Spouse name: Not on file    Number of children: Not on file    Years of education: Not on file    Highest education level: Not on file   Occupational History    Not on file   Tobacco Use    Smoking status: Every Day     Types: Cigarettes    Smokeless tobacco: Never    Tobacco comments:     5 per day   Substance and Sexual Activity    Alcohol use: Yes     Comment: occassionally    Drug use: No    Sexual activity: Yes     Partners: Male   Other Topics Concern    Parent/sibling w/ CABG, MI or angioplasty before 65F 55M? No   Social History Narrative    Not on file     Social Drivers of Health     Financial Resource Strain: High Risk (2022)    Received from [facility]    Financial Resource Strain     Difficulty of Paying Living Expenses: Not on file     Difficulty of Paying Living  "Expenses: Not on file   Food Insecurity: Not on File (2024)    Received from [facility]    Food Insecurity     Food: 0   Transportation Needs: Not on File (10/16/2021)    Received from [facility]    Transportation Needs     Transportation: 0   Physical Activity: Not on File (10/16/2021)    Received from [facility]    Physical Activity     Physical Activity: 0   Stress: Not on File (10/16/2021)    Received from [facility]    Stress     Stress: 0   Social Connections: Not on File (2024)    Received from [facility]    Social Connections     Connectedness: 0   Interpersonal Safety: Not on file   Housing Stability: Not on File (10/16/2021)    Received from [facility]    Housing Stability     Housin         Family History:   Family History   Problem Relation Age of Onset    Cancer Brother         osteo carcoma    Genetic Disorder Brother         cleft lip/palate    Diabetes Father     Hypertension Father     Heart Disease Paternal Grandfather     Hypertension Paternal Grandfather     Heart Disease Maternal Grandfather     Hypertension Maternal Grandfather     Heart Disease Paternal Grandmother     Hypertension Paternal Grandmother     Hypertension Mother        Physical Exam:   Vitals:    25 1258   BP: 133/84   BP Location: Right arm   Patient Position: Chair   Cuff Size: Adult Regular   Pulse: 78   Resp: 18   Temp: 98.7  F (37.1  C)   TempSrc: Tympanic   Weight: 58.1 kg (128 lb)   Height: 1.854 m (6' 1\")      Estimated body mass index is 16.89 kg/m  as calculated from the following:    Height as of this encounter: 1.854 m (6' 1\").    Weight as of this encounter: 58.1 kg (128 lb).    General appearance: well-hydrated, A&O x 3, no apparent distress  Lungs: breathing comfortably on room air.   Heart:warm and well perfused.    Constitutional: See vitals  Abdomen: Soft, non-tender, non-distended. No rebound, rigidity, or guarding.  Neuro: CN II-XII grossly intact  Genitourinary:  External genitalia: no erythema, no lesions.   Urethral " meatus appropriate location without lesions or prolapse  Urethra: No masses, tenderness, or scarring  Bladder no fullness, masses, or tenderness.  Anus and Perineum: Unremarkable, no visible lesions  Vagina: Normal, healthy pink mucosa without any lesions. Positive yellowish discharge. Wet prep obtained today. Patient declined gonorrhea/chlamydia testing today.   Cervix: well healing cervix status post LEEP, no cervical motion tenderness.   Uterus: normal size, shape and consistency with no tenderness with palpation on bimanual exam.   Adnexa: no masses or tenderness bilaterally.    Labs/Imaging: wet prep in process.     Assessment and Plan:       ICD-10-CM    1. Vaginal discharge  N89.8 Wet prep - Clinic Collect      No signs/symptoms of PID. Will treat pending wet prep results. Patient declined gonorrhea/chlamydia testing today.     Return to clinic as needed.   Corry Silverman PA-C

## 2025-08-06 ENCOUNTER — ALLIED HEALTH/NURSE VISIT (OUTPATIENT)
Dept: OBGYN | Facility: CLINIC | Age: 40
End: 2025-08-06
Payer: COMMERCIAL

## 2025-08-06 DIAGNOSIS — Z23 NEED FOR HPV VACCINE: ICD-10-CM

## 2025-08-06 DIAGNOSIS — Z23 NEED FOR TDAP VACCINATION: Primary | ICD-10-CM

## 2025-08-06 PROCEDURE — 90715 TDAP VACCINE 7 YRS/> IM: CPT

## 2025-08-06 PROCEDURE — 90471 IMMUNIZATION ADMIN: CPT

## 2025-08-06 PROCEDURE — 90651 9VHPV VACCINE 2/3 DOSE IM: CPT

## 2025-08-06 PROCEDURE — 90472 IMMUNIZATION ADMIN EACH ADD: CPT

## 2025-08-06 PROCEDURE — 99207 PR NO CHARGE NURSE ONLY: CPT

## 2025-08-20 ENCOUNTER — TRANSFERRED RECORDS (OUTPATIENT)
Dept: HEALTH INFORMATION MANAGEMENT | Facility: CLINIC | Age: 40
End: 2025-08-20
Payer: COMMERCIAL